# Patient Record
Sex: MALE | Race: WHITE | NOT HISPANIC OR LATINO | Employment: OTHER | URBAN - METROPOLITAN AREA
[De-identification: names, ages, dates, MRNs, and addresses within clinical notes are randomized per-mention and may not be internally consistent; named-entity substitution may affect disease eponyms.]

---

## 2020-01-01 DIAGNOSIS — R06.02 SHORTNESS OF BREATH: ICD-10-CM

## 2020-01-01 LAB — SARS-COV-2 RNA SPEC QL NAA+PROBE: NOT DETECTED

## 2020-01-01 PROCEDURE — U0003 INFECTIOUS AGENT DETECTION BY NUCLEIC ACID (DNA OR RNA); SEVERE ACUTE RESPIRATORY SYNDROME CORONAVIRUS 2 (SARS-COV-2) (CORONAVIRUS DISEASE [COVID-19]), AMPLIFIED PROBE TECHNIQUE, MAKING USE OF HIGH THROUGHPUT TECHNOLOGIES AS DESCRIBED BY CMS-2020-01-R: HCPCS | Performed by: INTERNAL MEDICINE

## 2021-01-01 ENCOUNTER — APPOINTMENT (INPATIENT)
Dept: NON INVASIVE DIAGNOSTICS | Facility: HOSPITAL | Age: 71
DRG: 871 | End: 2021-01-01
Payer: MEDICARE

## 2021-01-01 ENCOUNTER — HOSPITAL ENCOUNTER (INPATIENT)
Facility: HOSPITAL | Age: 71
LOS: 2 days | DRG: 871 | End: 2021-02-10
Attending: EMERGENCY MEDICINE | Admitting: ANESTHESIOLOGY
Payer: MEDICARE

## 2021-01-01 ENCOUNTER — APPOINTMENT (EMERGENCY)
Dept: RADIOLOGY | Facility: HOSPITAL | Age: 71
DRG: 871 | End: 2021-01-01
Payer: MEDICARE

## 2021-01-01 ENCOUNTER — APPOINTMENT (INPATIENT)
Dept: RADIOLOGY | Facility: HOSPITAL | Age: 71
DRG: 871 | End: 2021-01-01
Payer: MEDICARE

## 2021-01-01 VITALS
BODY MASS INDEX: 43.18 KG/M2 | WEIGHT: 275.13 LBS | SYSTOLIC BLOOD PRESSURE: 128 MMHG | OXYGEN SATURATION: 81 % | TEMPERATURE: 100 F | DIASTOLIC BLOOD PRESSURE: 97 MMHG | HEIGHT: 67 IN | RESPIRATION RATE: 150 BRPM

## 2021-01-01 DIAGNOSIS — B95.8 BACTEREMIA DUE TO STAPHYLOCOCCUS: ICD-10-CM

## 2021-01-01 DIAGNOSIS — N17.9 AKI (ACUTE KIDNEY INJURY) (HCC): ICD-10-CM

## 2021-01-01 DIAGNOSIS — E11.9 DIABETES (HCC): ICD-10-CM

## 2021-01-01 DIAGNOSIS — D17.71 ANGIOLIPOMA OF LEFT KIDNEY: ICD-10-CM

## 2021-01-01 DIAGNOSIS — J96.92 RESPIRATORY FAILURE WITH HYPERCAPNIA (HCC): ICD-10-CM

## 2021-01-01 DIAGNOSIS — J18.9 PNEUMONIA: ICD-10-CM

## 2021-01-01 DIAGNOSIS — R65.20 SEVERE SEPSIS (HCC): ICD-10-CM

## 2021-01-01 DIAGNOSIS — A41.9 SEVERE SEPSIS (HCC): ICD-10-CM

## 2021-01-01 DIAGNOSIS — R78.81 BACTEREMIA DUE TO STAPHYLOCOCCUS: ICD-10-CM

## 2021-01-01 DIAGNOSIS — I21.4 NSTEMI (NON-ST ELEVATED MYOCARDIAL INFARCTION) (HCC): ICD-10-CM

## 2021-01-01 DIAGNOSIS — I50.9 CHF (CONGESTIVE HEART FAILURE) (HCC): Primary | ICD-10-CM

## 2021-01-01 DIAGNOSIS — E63.8 IMBALANCED NUTRITION: ICD-10-CM

## 2021-01-01 LAB
ALBUMIN SERPL BCP-MCNC: 2.9 G/DL (ref 3.5–5)
ALP SERPL-CCNC: 86 U/L (ref 46–116)
ALT SERPL W P-5'-P-CCNC: 28 U/L (ref 12–78)
AMMONIA PLAS-SCNC: <10 UMOL/L (ref 11–35)
ANION GAP SERPL CALCULATED.3IONS-SCNC: 4 MMOL/L (ref 4–13)
ANION GAP SERPL CALCULATED.3IONS-SCNC: 5 MMOL/L (ref 4–13)
ANION GAP SERPL CALCULATED.3IONS-SCNC: 8 MMOL/L (ref 4–13)
APTT PPP: 33 SECONDS (ref 23–37)
ARTERIAL PATENCY WRIST A: YES
AST SERPL W P-5'-P-CCNC: 41 U/L (ref 5–45)
ATRIAL RATE: 121 BPM
BACTERIA UR QL AUTO: ABNORMAL /HPF
BASE EXCESS BLDA CALC-SCNC: 1.8 MMOL/L
BASOPHILS # BLD AUTO: 0.02 THOUSANDS/ΜL (ref 0–0.1)
BASOPHILS # BLD AUTO: 0.03 THOUSANDS/ΜL (ref 0–0.1)
BASOPHILS NFR BLD AUTO: 0 % (ref 0–1)
BASOPHILS NFR BLD AUTO: 0 % (ref 0–1)
BILIRUB SERPL-MCNC: 0.8 MG/DL (ref 0.2–1)
BILIRUB UR QL STRIP: NEGATIVE
BODY TEMPERATURE: 98 DEGREES FEHRENHEIT
BUN SERPL-MCNC: 40 MG/DL (ref 5–25)
BUN SERPL-MCNC: 55 MG/DL (ref 5–25)
BUN SERPL-MCNC: 71 MG/DL (ref 5–25)
CALCIUM ALBUM COR SERPL-MCNC: 9.6 MG/DL (ref 8.3–10.1)
CALCIUM SERPL-MCNC: 8.5 MG/DL (ref 8.3–10.1)
CALCIUM SERPL-MCNC: 8.6 MG/DL (ref 8.3–10.1)
CALCIUM SERPL-MCNC: 8.7 MG/DL (ref 8.3–10.1)
CHLORIDE SERPL-SCNC: 100 MMOL/L (ref 100–108)
CHLORIDE SERPL-SCNC: 100 MMOL/L (ref 100–108)
CHLORIDE SERPL-SCNC: 98 MMOL/L (ref 100–108)
CLARITY UR: CLEAR
CO2 SERPL-SCNC: 31 MMOL/L (ref 21–32)
CO2 SERPL-SCNC: 33 MMOL/L (ref 21–32)
CO2 SERPL-SCNC: 33 MMOL/L (ref 21–32)
COLOR UR: YELLOW
CREAT SERPL-MCNC: 1.88 MG/DL (ref 0.6–1.3)
CREAT SERPL-MCNC: 2.45 MG/DL (ref 0.6–1.3)
CREAT SERPL-MCNC: 3.42 MG/DL (ref 0.6–1.3)
CRP SERPL QL: 169 MG/L
EOSINOPHIL # BLD AUTO: 0 THOUSAND/ΜL (ref 0–0.61)
EOSINOPHIL # BLD AUTO: 0 THOUSAND/ΜL (ref 0–0.61)
EOSINOPHIL NFR BLD AUTO: 0 % (ref 0–6)
EOSINOPHIL NFR BLD AUTO: 0 % (ref 0–6)
ERYTHROCYTE [DISTWIDTH] IN BLOOD BY AUTOMATED COUNT: 19.3 % (ref 11.6–15.1)
ERYTHROCYTE [DISTWIDTH] IN BLOOD BY AUTOMATED COUNT: 19.8 % (ref 11.6–15.1)
ERYTHROCYTE [SEDIMENTATION RATE] IN BLOOD: 80 MM/HOUR (ref 0–19)
EST. AVERAGE GLUCOSE BLD GHB EST-MCNC: 197 MG/DL
FLUAV RNA RESP QL NAA+PROBE: NEGATIVE
FLUBV RNA RESP QL NAA+PROBE: NEGATIVE
GFR SERPL CREATININE-BSD FRML MDRD: 17 ML/MIN/1.73SQ M
GFR SERPL CREATININE-BSD FRML MDRD: 26 ML/MIN/1.73SQ M
GFR SERPL CREATININE-BSD FRML MDRD: 35 ML/MIN/1.73SQ M
GLUCOSE SERPL-MCNC: 172 MG/DL (ref 65–140)
GLUCOSE SERPL-MCNC: 179 MG/DL (ref 65–140)
GLUCOSE SERPL-MCNC: 190 MG/DL (ref 65–140)
GLUCOSE SERPL-MCNC: 192 MG/DL (ref 65–140)
GLUCOSE SERPL-MCNC: 196 MG/DL (ref 65–140)
GLUCOSE SERPL-MCNC: 217 MG/DL (ref 65–140)
GLUCOSE UR STRIP-MCNC: NEGATIVE MG/DL
HBA1C MFR BLD: 8.5 %
HCO3 BLDA-SCNC: 30.5 MMOL/L (ref 22–28)
HCT VFR BLD AUTO: 51.6 % (ref 36.5–49.3)
HCT VFR BLD AUTO: 54.5 % (ref 36.5–49.3)
HGB BLD-MCNC: 14.2 G/DL (ref 12–17)
HGB BLD-MCNC: 15.1 G/DL (ref 12–17)
HGB UR QL STRIP.AUTO: ABNORMAL
HYALINE CASTS #/AREA URNS LPF: ABNORMAL /LPF
IMM GRANULOCYTES # BLD AUTO: 0.03 THOUSAND/UL (ref 0–0.2)
IMM GRANULOCYTES # BLD AUTO: 0.06 THOUSAND/UL (ref 0–0.2)
IMM GRANULOCYTES NFR BLD AUTO: 0 % (ref 0–2)
IMM GRANULOCYTES NFR BLD AUTO: 0 % (ref 0–2)
INR PPP: 1.15 (ref 0.84–1.19)
IPAP: 16
KETONES UR STRIP-MCNC: NEGATIVE MG/DL
L PNEUMO1 AG UR QL IA.RAPID: NEGATIVE
LACTATE SERPL-SCNC: 1.4 MMOL/L (ref 0.5–2)
LACTATE SERPL-SCNC: 2.2 MMOL/L (ref 0.5–2)
LEUKOCYTE ESTERASE UR QL STRIP: NEGATIVE
LYMPHOCYTES # BLD AUTO: 0.27 THOUSANDS/ΜL (ref 0.6–4.47)
LYMPHOCYTES # BLD AUTO: 0.28 THOUSANDS/ΜL (ref 0.6–4.47)
LYMPHOCYTES NFR BLD AUTO: 2 % (ref 14–44)
LYMPHOCYTES NFR BLD AUTO: 3 % (ref 14–44)
MAGNESIUM SERPL-MCNC: 1.9 MG/DL (ref 1.6–2.6)
MAGNESIUM SERPL-MCNC: 2.9 MG/DL (ref 1.6–2.6)
MCH RBC QN AUTO: 19 PG (ref 26.8–34.3)
MCH RBC QN AUTO: 19.3 PG (ref 26.8–34.3)
MCHC RBC AUTO-ENTMCNC: 27.5 G/DL (ref 31.4–37.4)
MCHC RBC AUTO-ENTMCNC: 27.7 G/DL (ref 31.4–37.4)
MCV RBC AUTO: 69 FL (ref 82–98)
MCV RBC AUTO: 70 FL (ref 82–98)
MONOCYTES # BLD AUTO: 0.99 THOUSAND/ΜL (ref 0.17–1.22)
MONOCYTES # BLD AUTO: 1.05 THOUSAND/ΜL (ref 0.17–1.22)
MONOCYTES NFR BLD AUTO: 8 % (ref 4–12)
MONOCYTES NFR BLD AUTO: 9 % (ref 4–12)
NEUTROPHILS # BLD AUTO: 12.05 THOUSANDS/ΜL (ref 1.85–7.62)
NEUTROPHILS # BLD AUTO: 9.51 THOUSANDS/ΜL (ref 1.85–7.62)
NEUTS SEG NFR BLD AUTO: 88 % (ref 43–75)
NEUTS SEG NFR BLD AUTO: 90 % (ref 43–75)
NITRITE UR QL STRIP: NEGATIVE
NON VENT- BIPAP: ABNORMAL
NON-SQ EPI CELLS URNS QL MICRO: ABNORMAL /HPF
NRBC BLD AUTO-RTO: 0 /100 WBCS
NRBC BLD AUTO-RTO: 0 /100 WBCS
NT-PROBNP SERPL-MCNC: 1624 PG/ML
O2 CT BLDA-SCNC: 96 ML/DL (ref 95–100)
OTHER STN SPEC: ABNORMAL
OXYHGB MFR BLDA: 94.3 % (ref 94–97)
P AXIS: -29 DEGREES
PCO2 BLDA: 65.7 MM HG (ref 36–44)
PCO2 TEMP ADJ BLDA: 64.8 MM HG (ref 36–44)
PEEP MAX SETTING VENT: 5 CM[H2O]
PH BLD: 7.29 [PH] (ref 7.35–7.45)
PH BLDA: 7.28 [PH] (ref 7.35–7.45)
PH UR STRIP.AUTO: 5 [PH]
PHOSPHATE SERPL-MCNC: 6.9 MG/DL (ref 2.3–4.1)
PLATELET # BLD AUTO: 256 THOUSANDS/UL (ref 149–390)
PLATELET # BLD AUTO: 304 THOUSANDS/UL (ref 149–390)
PMV BLD AUTO: 8.7 FL (ref 8.9–12.7)
PMV BLD AUTO: 9 FL (ref 8.9–12.7)
PO2 BLD: 92.8 MM HG (ref 75–129)
PO2 BLDA: 94.5 MM HG (ref 75–129)
POTASSIUM SERPL-SCNC: 4.6 MMOL/L (ref 3.5–5.3)
POTASSIUM SERPL-SCNC: 5.1 MMOL/L (ref 3.5–5.3)
POTASSIUM SERPL-SCNC: 5.2 MMOL/L (ref 3.5–5.3)
PR INTERVAL: 174 MS
PROCALCITONIN SERPL-MCNC: 1.17 NG/ML
PROT SERPL-MCNC: 7.6 G/DL (ref 6.4–8.2)
PROT UR STRIP-MCNC: ABNORMAL MG/DL
PROTHROMBIN TIME: 14.6 SECONDS (ref 11.6–14.5)
QRS AXIS: 56 DEGREES
QRSD INTERVAL: 92 MS
QT INTERVAL: 326 MS
QTC INTERVAL: 462 MS
RBC # BLD AUTO: 7.34 MILLION/UL (ref 3.88–5.62)
RBC # BLD AUTO: 7.96 MILLION/UL (ref 3.88–5.62)
RBC #/AREA URNS AUTO: ABNORMAL /HPF
RSV RNA RESP QL NAA+PROBE: NEGATIVE
S PNEUM AG UR QL: NEGATIVE
SARS-COV-2 RNA RESP QL NAA+PROBE: NEGATIVE
SODIUM SERPL-SCNC: 136 MMOL/L (ref 136–145)
SODIUM SERPL-SCNC: 137 MMOL/L (ref 136–145)
SODIUM SERPL-SCNC: 139 MMOL/L (ref 136–145)
SP GR UR STRIP.AUTO: 1.02 (ref 1–1.03)
SPECIMEN SOURCE: ABNORMAL
T WAVE AXIS: 12 DEGREES
TROPONIN I SERPL-MCNC: 0.1 NG/ML
TROPONIN I SERPL-MCNC: 0.11 NG/ML
TROPONIN I SERPL-MCNC: 0.12 NG/ML
TSH SERPL DL<=0.05 MIU/L-ACNC: 0.83 UIU/ML (ref 0.36–3.74)
UROBILINOGEN UR QL STRIP.AUTO: 0.2 E.U./DL
VENT BIPAP FIO2: 60 %
VENTRICULAR RATE: 121 BPM
WBC # BLD AUTO: 10.82 THOUSAND/UL (ref 4.31–10.16)
WBC # BLD AUTO: 13.47 THOUSAND/UL (ref 4.31–10.16)
WBC #/AREA URNS AUTO: ABNORMAL /HPF

## 2021-01-01 PROCEDURE — 87081 CULTURE SCREEN ONLY: CPT | Performed by: ANESTHESIOLOGY

## 2021-01-01 PROCEDURE — G1004 CDSM NDSC: HCPCS

## 2021-01-01 PROCEDURE — 99291 CRITICAL CARE FIRST HOUR: CPT | Performed by: ANESTHESIOLOGY

## 2021-01-01 PROCEDURE — 84484 ASSAY OF TROPONIN QUANT: CPT | Performed by: EMERGENCY MEDICINE

## 2021-01-01 PROCEDURE — 94760 N-INVAS EAR/PLS OXIMETRY 1: CPT

## 2021-01-01 PROCEDURE — 83880 ASSAY OF NATRIURETIC PEPTIDE: CPT | Performed by: EMERGENCY MEDICINE

## 2021-01-01 PROCEDURE — 74177 CT ABD & PELVIS W/CONTRAST: CPT

## 2021-01-01 PROCEDURE — 85025 COMPLETE CBC W/AUTO DIFF WBC: CPT | Performed by: PHYSICIAN ASSISTANT

## 2021-01-01 PROCEDURE — 93970 EXTREMITY STUDY: CPT | Performed by: SURGERY

## 2021-01-01 PROCEDURE — 80048 BASIC METABOLIC PNL TOTAL CA: CPT | Performed by: PHYSICIAN ASSISTANT

## 2021-01-01 PROCEDURE — 70487 CT MAXILLOFACIAL W/DYE: CPT

## 2021-01-01 PROCEDURE — 93970 EXTREMITY STUDY: CPT

## 2021-01-01 PROCEDURE — 36600 WITHDRAWAL OF ARTERIAL BLOOD: CPT

## 2021-01-01 PROCEDURE — C8929 TTE W OR WO FOL WCON,DOPPLER: HCPCS

## 2021-01-01 PROCEDURE — 71260 CT THORAX DX C+: CPT

## 2021-01-01 PROCEDURE — 71045 X-RAY EXAM CHEST 1 VIEW: CPT

## 2021-01-01 PROCEDURE — 31500 INSERT EMERGENCY AIRWAY: CPT | Performed by: PHYSICIAN ASSISTANT

## 2021-01-01 PROCEDURE — 84484 ASSAY OF TROPONIN QUANT: CPT

## 2021-01-01 PROCEDURE — 84100 ASSAY OF PHOSPHORUS: CPT | Performed by: PHYSICIAN ASSISTANT

## 2021-01-01 PROCEDURE — 99223 1ST HOSP IP/OBS HIGH 75: CPT | Performed by: INTERNAL MEDICINE

## 2021-01-01 PROCEDURE — 99291 CRITICAL CARE FIRST HOUR: CPT | Performed by: EMERGENCY MEDICINE

## 2021-01-01 PROCEDURE — 94640 AIRWAY INHALATION TREATMENT: CPT

## 2021-01-01 PROCEDURE — 96365 THER/PROPH/DIAG IV INF INIT: CPT

## 2021-01-01 PROCEDURE — 93005 ELECTROCARDIOGRAM TRACING: CPT

## 2021-01-01 PROCEDURE — 99238 HOSP IP/OBS DSCHRG MGMT 30/<: CPT | Performed by: PHYSICIAN ASSISTANT

## 2021-01-01 PROCEDURE — 85610 PROTHROMBIN TIME: CPT | Performed by: EMERGENCY MEDICINE

## 2021-01-01 PROCEDURE — 0241U HB NFCT DS VIR RESP RNA 4 TRGT: CPT | Performed by: EMERGENCY MEDICINE

## 2021-01-01 PROCEDURE — 92950 HEART/LUNG RESUSCITATION CPR: CPT

## 2021-01-01 PROCEDURE — 87449 NOS EACH ORGANISM AG IA: CPT | Performed by: ANESTHESIOLOGY

## 2021-01-01 PROCEDURE — 94668 MNPJ CHEST WALL SBSQ: CPT

## 2021-01-01 PROCEDURE — 96375 TX/PRO/DX INJ NEW DRUG ADDON: CPT

## 2021-01-01 PROCEDURE — 83605 ASSAY OF LACTIC ACID: CPT | Performed by: EMERGENCY MEDICINE

## 2021-01-01 PROCEDURE — 93306 TTE W/DOPPLER COMPLETE: CPT | Performed by: INTERNAL MEDICINE

## 2021-01-01 PROCEDURE — 85652 RBC SED RATE AUTOMATED: CPT | Performed by: EMERGENCY MEDICINE

## 2021-01-01 PROCEDURE — 84484 ASSAY OF TROPONIN QUANT: CPT | Performed by: STUDENT IN AN ORGANIZED HEALTH CARE EDUCATION/TRAINING PROGRAM

## 2021-01-01 PROCEDURE — 82948 REAGENT STRIP/BLOOD GLUCOSE: CPT

## 2021-01-01 PROCEDURE — 94660 CPAP INITIATION&MGMT: CPT

## 2021-01-01 PROCEDURE — 96374 THER/PROPH/DIAG INJ IV PUSH: CPT

## 2021-01-01 PROCEDURE — 83735 ASSAY OF MAGNESIUM: CPT | Performed by: EMERGENCY MEDICINE

## 2021-01-01 PROCEDURE — 86140 C-REACTIVE PROTEIN: CPT | Performed by: EMERGENCY MEDICINE

## 2021-01-01 PROCEDURE — 0BH17EZ INSERTION OF ENDOTRACHEAL AIRWAY INTO TRACHEA, VIA NATURAL OR ARTIFICIAL OPENING: ICD-10-PCS | Performed by: ANESTHESIOLOGY

## 2021-01-01 PROCEDURE — 82805 BLOOD GASES W/O2 SATURATION: CPT | Performed by: EMERGENCY MEDICINE

## 2021-01-01 PROCEDURE — 5A1935Z RESPIRATORY VENTILATION, LESS THAN 24 CONSECUTIVE HOURS: ICD-10-PCS | Performed by: ANESTHESIOLOGY

## 2021-01-01 PROCEDURE — 87186 SC STD MICRODIL/AGAR DIL: CPT | Performed by: EMERGENCY MEDICINE

## 2021-01-01 PROCEDURE — 70450 CT HEAD/BRAIN W/O DYE: CPT

## 2021-01-01 PROCEDURE — 99233 SBSQ HOSP IP/OBS HIGH 50: CPT | Performed by: ANESTHESIOLOGY

## 2021-01-01 PROCEDURE — 80053 COMPREHEN METABOLIC PANEL: CPT | Performed by: EMERGENCY MEDICINE

## 2021-01-01 PROCEDURE — 85730 THROMBOPLASTIN TIME PARTIAL: CPT | Performed by: EMERGENCY MEDICINE

## 2021-01-01 PROCEDURE — 84145 PROCALCITONIN (PCT): CPT | Performed by: STUDENT IN AN ORGANIZED HEALTH CARE EDUCATION/TRAINING PROGRAM

## 2021-01-01 PROCEDURE — 87040 BLOOD CULTURE FOR BACTERIA: CPT | Performed by: EMERGENCY MEDICINE

## 2021-01-01 PROCEDURE — 83036 HEMOGLOBIN GLYCOSYLATED A1C: CPT | Performed by: STUDENT IN AN ORGANIZED HEALTH CARE EDUCATION/TRAINING PROGRAM

## 2021-01-01 PROCEDURE — 93010 ELECTROCARDIOGRAM REPORT: CPT | Performed by: INTERNAL MEDICINE

## 2021-01-01 PROCEDURE — 94002 VENT MGMT INPAT INIT DAY: CPT

## 2021-01-01 PROCEDURE — 82140 ASSAY OF AMMONIA: CPT | Performed by: EMERGENCY MEDICINE

## 2021-01-01 PROCEDURE — 83735 ASSAY OF MAGNESIUM: CPT | Performed by: PHYSICIAN ASSISTANT

## 2021-01-01 PROCEDURE — 80048 BASIC METABOLIC PNL TOTAL CA: CPT | Performed by: STUDENT IN AN ORGANIZED HEALTH CARE EDUCATION/TRAINING PROGRAM

## 2021-01-01 PROCEDURE — 85025 COMPLETE CBC W/AUTO DIFF WBC: CPT | Performed by: EMERGENCY MEDICINE

## 2021-01-01 PROCEDURE — 36415 COLL VENOUS BLD VENIPUNCTURE: CPT | Performed by: EMERGENCY MEDICINE

## 2021-01-01 PROCEDURE — 84443 ASSAY THYROID STIM HORMONE: CPT | Performed by: ANESTHESIOLOGY

## 2021-01-01 PROCEDURE — 81001 URINALYSIS AUTO W/SCOPE: CPT | Performed by: EMERGENCY MEDICINE

## 2021-01-01 PROCEDURE — 99291 CRITICAL CARE FIRST HOUR: CPT

## 2021-01-01 RX ORDER — FUROSEMIDE 10 MG/ML
40 INJECTION INTRAMUSCULAR; INTRAVENOUS ONCE
Status: COMPLETED | OUTPATIENT
Start: 2021-01-01 | End: 2021-01-01

## 2021-01-01 RX ORDER — CEFTRIAXONE 1 G/50ML
1000 INJECTION, SOLUTION INTRAVENOUS EVERY 24 HOURS
Status: DISCONTINUED | OUTPATIENT
Start: 2021-01-01 | End: 2021-01-01

## 2021-01-01 RX ORDER — ACETAMINOPHEN 325 MG/1
650 TABLET ORAL EVERY 6 HOURS PRN
Status: DISCONTINUED | OUTPATIENT
Start: 2021-01-01 | End: 2021-02-10 | Stop reason: HOSPADM

## 2021-01-01 RX ORDER — HALOPERIDOL 5 MG/ML
5 INJECTION INTRAMUSCULAR ONCE
Status: COMPLETED | OUTPATIENT
Start: 2021-01-01 | End: 2021-01-01

## 2021-01-01 RX ORDER — NYSTATIN 100000 [USP'U]/G
POWDER TOPICAL 2 TIMES DAILY
Status: DISCONTINUED | OUTPATIENT
Start: 2021-01-01 | End: 2021-02-10 | Stop reason: HOSPADM

## 2021-01-01 RX ORDER — EPINEPHRINE 0.1 MG/ML
SYRINGE (ML) INJECTION CODE/TRAUMA/SEDATION MEDICATION
Status: COMPLETED | OUTPATIENT
Start: 2021-01-01 | End: 2021-01-01

## 2021-01-01 RX ORDER — CALCIUM CHLORIDE 100 MG/ML
SYRINGE (ML) INTRAVENOUS CODE/TRAUMA/SEDATION MEDICATION
Status: COMPLETED | OUTPATIENT
Start: 2021-01-01 | End: 2021-01-01

## 2021-01-01 RX ORDER — SODIUM BICARBONATE 84 MG/ML
INJECTION, SOLUTION INTRAVENOUS CODE/TRAUMA/SEDATION MEDICATION
Status: COMPLETED | OUTPATIENT
Start: 2021-01-01 | End: 2021-01-01

## 2021-01-01 RX ORDER — HEPARIN SODIUM 5000 [USP'U]/ML
5000 INJECTION, SOLUTION INTRAVENOUS; SUBCUTANEOUS EVERY 8 HOURS SCHEDULED
Status: DISCONTINUED | OUTPATIENT
Start: 2021-01-01 | End: 2021-02-10 | Stop reason: HOSPADM

## 2021-01-01 RX ORDER — VANCOMYCIN HYDROCHLORIDE 1 G/200ML
10 INJECTION, SOLUTION INTRAVENOUS EVERY 12 HOURS
Status: DISCONTINUED | OUTPATIENT
Start: 2021-01-01 | End: 2021-02-10 | Stop reason: HOSPADM

## 2021-01-01 RX ORDER — HALOPERIDOL 5 MG/ML
5 INJECTION INTRAMUSCULAR EVERY 6 HOURS PRN
Status: DISCONTINUED | OUTPATIENT
Start: 2021-01-01 | End: 2021-02-10 | Stop reason: HOSPADM

## 2021-01-01 RX ORDER — ALBUTEROL SULFATE 2.5 MG/3ML
2.5 SOLUTION RESPIRATORY (INHALATION)
Status: DISCONTINUED | OUTPATIENT
Start: 2021-01-01 | End: 2021-02-10 | Stop reason: HOSPADM

## 2021-01-01 RX ORDER — SODIUM CHLORIDE, SODIUM LACTATE, POTASSIUM CHLORIDE, CALCIUM CHLORIDE 600; 310; 30; 20 MG/100ML; MG/100ML; MG/100ML; MG/100ML
100 INJECTION, SOLUTION INTRAVENOUS CONTINUOUS
Status: DISCONTINUED | OUTPATIENT
Start: 2021-01-01 | End: 2021-02-10 | Stop reason: HOSPADM

## 2021-01-01 RX ORDER — ACETAMINOPHEN 325 MG/1
650 TABLET ORAL ONCE
Status: COMPLETED | OUTPATIENT
Start: 2021-01-01 | End: 2021-01-01

## 2021-01-01 RX ORDER — ALBUTEROL SULFATE 2.5 MG/3ML
2.5 SOLUTION RESPIRATORY (INHALATION) EVERY 4 HOURS PRN
Status: DISCONTINUED | OUTPATIENT
Start: 2021-01-01 | End: 2021-02-10 | Stop reason: HOSPADM

## 2021-01-01 RX ORDER — CEFTRIAXONE 1 G/50ML
1000 INJECTION, SOLUTION INTRAVENOUS ONCE
Status: COMPLETED | OUTPATIENT
Start: 2021-01-01 | End: 2021-01-01

## 2021-01-01 RX ORDER — MAGNESIUM SULFATE HEPTAHYDRATE 40 MG/ML
2 INJECTION, SOLUTION INTRAVENOUS ONCE
Status: COMPLETED | OUTPATIENT
Start: 2021-01-01 | End: 2021-01-01

## 2021-01-01 RX ADMIN — EPINEPHRINE 1 MG: 0.1 INJECTION, SOLUTION ENDOTRACHEAL; INTRACARDIAC; INTRAVENOUS at 22:28

## 2021-01-01 RX ADMIN — AZITHROMYCIN MONOHYDRATE 500 MG: 500 INJECTION, POWDER, LYOPHILIZED, FOR SOLUTION INTRAVENOUS at 12:25

## 2021-01-01 RX ADMIN — VANCOMYCIN HYDROCHLORIDE 1000 MG: 1 INJECTION, SOLUTION INTRAVENOUS at 06:47

## 2021-01-01 RX ADMIN — HALOPERIDOL LACTATE 5 MG: 5 INJECTION, SOLUTION INTRAMUSCULAR at 20:45

## 2021-01-01 RX ADMIN — AMPICILLIN SODIUM AND SULBACTAM SODIUM 3 G: 2; 1 INJECTION, POWDER, FOR SOLUTION INTRAMUSCULAR; INTRAVENOUS at 07:55

## 2021-01-01 RX ADMIN — SODIUM BICARBONATE 50 MEQ: 84 INJECTION, SOLUTION INTRAVENOUS at 22:38

## 2021-01-01 RX ADMIN — NYSTATIN: 100000 POWDER TOPICAL at 18:35

## 2021-01-01 RX ADMIN — EPINEPHRINE 1 MG: 0.1 INJECTION, SOLUTION ENDOTRACHEAL; INTRACARDIAC; INTRAVENOUS at 22:37

## 2021-01-01 RX ADMIN — AMPICILLIN SODIUM AND SULBACTAM SODIUM 3 G: 2; 1 INJECTION, POWDER, FOR SOLUTION INTRAMUSCULAR; INTRAVENOUS at 20:40

## 2021-01-01 RX ADMIN — INSULIN LISPRO 2 UNITS: 100 INJECTION, SOLUTION INTRAVENOUS; SUBCUTANEOUS at 18:33

## 2021-01-01 RX ADMIN — HALOPERIDOL LACTATE 5 MG: 5 INJECTION, SOLUTION INTRAMUSCULAR at 04:49

## 2021-01-01 RX ADMIN — VANCOMYCIN HYDROCHLORIDE 1000 MG: 1 INJECTION, SOLUTION INTRAVENOUS at 19:24

## 2021-01-01 RX ADMIN — PERFLUTREN 2 ML/MIN: 6.52 INJECTION, SUSPENSION INTRAVENOUS at 14:20

## 2021-01-01 RX ADMIN — IOHEXOL 100 ML: 350 INJECTION, SOLUTION INTRAVENOUS at 14:22

## 2021-01-01 RX ADMIN — SODIUM BICARBONATE 50 MEQ: 84 INJECTION, SOLUTION INTRAVENOUS at 22:35

## 2021-01-01 RX ADMIN — EPINEPHRINE 1 MG: 0.1 INJECTION, SOLUTION ENDOTRACHEAL; INTRACARDIAC; INTRAVENOUS at 22:43

## 2021-01-01 RX ADMIN — EPINEPHRINE 1 MG: 0.1 INJECTION, SOLUTION ENDOTRACHEAL; INTRACARDIAC; INTRAVENOUS at 22:34

## 2021-01-01 RX ADMIN — NYSTATIN: 100000 POWDER TOPICAL at 09:00

## 2021-01-01 RX ADMIN — AZITHROMYCIN MONOHYDRATE 500 MG: 500 INJECTION, POWDER, LYOPHILIZED, FOR SOLUTION INTRAVENOUS at 11:58

## 2021-01-01 RX ADMIN — SODIUM CHLORIDE, SODIUM LACTATE, POTASSIUM CHLORIDE, AND CALCIUM CHLORIDE 100 ML/HR: .6; .31; .03; .02 INJECTION, SOLUTION INTRAVENOUS at 21:47

## 2021-01-01 RX ADMIN — AMPICILLIN SODIUM AND SULBACTAM SODIUM 3 G: 2; 1 INJECTION, POWDER, FOR SOLUTION INTRAMUSCULAR; INTRAVENOUS at 02:30

## 2021-01-01 RX ADMIN — INSULIN LISPRO 1 UNITS: 100 INJECTION, SOLUTION INTRAVENOUS; SUBCUTANEOUS at 12:21

## 2021-01-01 RX ADMIN — SODIUM CHLORIDE, SODIUM LACTATE, POTASSIUM CHLORIDE, AND CALCIUM CHLORIDE 50 ML/HR: .6; .31; .03; .02 INJECTION, SOLUTION INTRAVENOUS at 06:47

## 2021-01-01 RX ADMIN — EPINEPHRINE 1 MG: 0.1 INJECTION, SOLUTION ENDOTRACHEAL; INTRACARDIAC; INTRAVENOUS at 22:40

## 2021-01-01 RX ADMIN — FUROSEMIDE 40 MG: 10 INJECTION, SOLUTION INTRAMUSCULAR; INTRAVENOUS at 13:14

## 2021-01-01 RX ADMIN — CALCIUM CHLORIDE 1 G: 100 INJECTION PARENTERAL at 22:45

## 2021-01-01 RX ADMIN — HEPARIN SODIUM 5000 UNITS: 5000 INJECTION INTRAVENOUS; SUBCUTANEOUS at 14:44

## 2021-01-01 RX ADMIN — ALBUTEROL SULFATE 2.5 MG: 2.5 SOLUTION RESPIRATORY (INHALATION) at 21:25

## 2021-01-01 RX ADMIN — VANCOMYCIN HYDROCHLORIDE 1000 MG: 1 INJECTION, SOLUTION INTRAVENOUS at 18:40

## 2021-01-01 RX ADMIN — CALCIUM CHLORIDE 1 G: 100 INJECTION PARENTERAL at 22:26

## 2021-01-01 RX ADMIN — EPINEPHRINE 1 MG: 0.1 INJECTION, SOLUTION ENDOTRACHEAL; INTRACARDIAC; INTRAVENOUS at 22:25

## 2021-01-01 RX ADMIN — HEPARIN SODIUM 5000 UNITS: 5000 INJECTION INTRAVENOUS; SUBCUTANEOUS at 21:46

## 2021-01-01 RX ADMIN — NYSTATIN 1 APPLICATION: 100000 POWDER TOPICAL at 20:47

## 2021-01-01 RX ADMIN — SODIUM BICARBONATE 50 MEQ: 84 INJECTION, SOLUTION INTRAVENOUS at 22:32

## 2021-01-01 RX ADMIN — EPINEPHRINE 1 MG: 0.1 INJECTION, SOLUTION ENDOTRACHEAL; INTRACARDIAC; INTRAVENOUS at 22:46

## 2021-01-01 RX ADMIN — ACETAMINOPHEN 650 MG: 325 TABLET, FILM COATED ORAL at 13:53

## 2021-01-01 RX ADMIN — EPINEPHRINE 1 MG: 0.1 INJECTION, SOLUTION ENDOTRACHEAL; INTRACARDIAC; INTRAVENOUS at 22:49

## 2021-01-01 RX ADMIN — EPINEPHRINE 1 MG: 0.1 INJECTION, SOLUTION ENDOTRACHEAL; INTRACARDIAC; INTRAVENOUS at 22:31

## 2021-01-01 RX ADMIN — MAGNESIUM SULFATE HEPTAHYDRATE 2 G: 40 INJECTION, SOLUTION INTRAVENOUS at 21:46

## 2021-01-01 RX ADMIN — CEFTRIAXONE 1000 MG: 1 INJECTION, SOLUTION INTRAVENOUS at 12:13

## 2021-02-08 PROBLEM — S09.90XA HEAD INJURY: Status: ACTIVE | Noted: 2021-01-01

## 2021-02-08 PROBLEM — R65.20 SEVERE SEPSIS (HCC): Status: ACTIVE | Noted: 2021-01-01

## 2021-02-08 PROBLEM — E11.9 TYPE 2 DIABETES MELLITUS (HCC): Status: ACTIVE | Noted: 2021-01-01

## 2021-02-08 PROBLEM — A41.9 SEVERE SEPSIS (HCC): Status: ACTIVE | Noted: 2021-01-01

## 2021-02-08 PROBLEM — J96.01 ACUTE RESPIRATORY FAILURE WITH HYPOXIA (HCC): Status: ACTIVE | Noted: 2021-01-01

## 2021-02-08 PROBLEM — D17.71 ANGIOLIPOMA OF LEFT KIDNEY: Status: ACTIVE | Noted: 2021-01-01

## 2021-02-08 NOTE — ASSESSMENT & PLAN NOTE
Patient's Cr on admission 1 88  Baseline Cr not well known given last Cr in 2016 possible baseline about 1 0-1 1  · Avoid nephrotoxic medications  · Currently not on fluids given possible CHF exacerbation given BNP 1624 with no previous lab work per chart, Diuresed 40mg IV lasix in ED  Tolerating Diet  · Daily BMP and monitor Cr  Will await Echocardiogram results

## 2021-02-08 NOTE — ASSESSMENT & PLAN NOTE
Patient with no reported past medical hx of COPD, CHF, SUDHAKAR, presents with 5 day hx of shortness of breath which he reports occurred after he tripped and fell in home and hit his head  CT head no acute intracranial abnormality  BNP elevated at 1624, no previous baseline per chart review  COVID 19, Influenza A and B, and RSV PCR Negative  Blood gas in ED shows pH 7 28, pCO2, pO2 92 8, Bicarb 30 5  He was diuresed with 40mg Lasix IV IN ER  CXR shows hazy opacification of the left hemithorax, likely layering effusion  CT chest abdomen and pelvis shows patchy airspace opacities at the lung bases, liekly reflecting atelectasis vs pneumonia  · Patient was tried on Nasal cannula but failed and placed on BiPap in ER at Ipap 15, Epap 5, rate 15, 60% FiO2  Will continue Bipap for now  Aim for QHS with attempt to wean off during the day  · Will hold fluids a the present time as patient is on diet and possibility of overload  · Echo ordered for tomorrow given miles rales heard on examination however patient also seen laying flat and appears euvolemic in ER with no edema  Lasix as needed  · Strep and Legionella urinary antigen ordered  Sputum culture  · Will continue on IV Rocephin and Azithromycin for suspected Community acquired pneumonia  · Bilateral lower extremity duplex ordered  Will hold off on CTA chest given ELSY for now

## 2021-02-08 NOTE — ED PROVIDER NOTES
History  Chief Complaint   Patient presents with    Shortness of Breath     Per pt brother, pt has been SOB for several months  PT states that he has been SOB since he put the mask on     Head Injury     Pt ran into a beam several days ago and per pt brother he hasn't been right since     Pt in the ER with his brother, with c/o increasing SOB  Pt arrived in moderate respiratory distress, pulse ox of 66% and mildly confused  Pt also with facial cellulitis and crusting to both eyes, which began 2 days ago, after he ran into a beam in his trailer home  He denies chest pain/cough/fevers/chills  Pt has a hx of DM  Also hx of colon and kidney cancer, treated and in remission  History provided by:  Patient  History limited by:  Acuity of condition and severe respiratory distress   used: No    Shortness of Breath  Severity:  Moderate  Onset quality:  Gradual  Timing:  Constant  Progression:  Worsening  Chronicity:  Recurrent  Relieved by:  None tried  Worsened by:  Deep breathing, movement and activity  Ineffective treatments:  None tried  Associated symptoms: no abdominal pain, no chest pain, no cough, no fever, no rash, no vomiting and no wheezing    Risk factors: obesity        Prior to Admission Medications   Prescriptions Last Dose Informant Patient Reported? Taking? Cholecalciferol ([employer] VIT D 5000 HIGH-POTENCY) 5000 UNITS capsule   Yes No   Sig: Take 1,000 Units by mouth daily   insulin NPH-insulin regular (NovoLIN 70/30) 100 units/mL subcutaneous injection   Yes No   Sig: Inject 56 Units under the skin 2 (two) times a day before meals Takes 56 units a m   And 46-56 units hs according to accu check      Facility-Administered Medications: None       Past Medical History:   Diagnosis Date    Cancer [facility])     Chronic kidney disease     Colon cancer ([facility] )     s/p colon resection 9/2016    [facility]     Hypertension     Kidney lesion     mass of left kidney    Obesity Past Surgical History:   Procedure Laterality Date    COLON SURGERY      COLON SURGERY      for ca of colon 9/16/16    COLONOSCOPY      NEPHRECTOMY Left 10/27/2016    Procedure: PARTIAL NEPHRECTOMY ;  Surgeon: Brandi Key MD;  Location: [address] [place] [address];  Service:        History reviewed  No pertinent family history  I have reviewed and agree with the history as documented  E-Cigarette/Vaping     E-Cigarette/Vaping Substances     Social History     Tobacco Use    Smoking status: Never Smoker    Smokeless tobacco: Never Used   Substance Use Topics    Alcohol use: No    Drug use: No       Review of Systems   Constitutional: Negative for chills and fever  HENT: Negative for facial swelling and trouble swallowing  Eyes: Negative for photophobia, pain and redness  Respiratory: Positive for shortness of breath  Negative for cough and wheezing  Cardiovascular: Positive for leg swelling  Negative for chest pain and palpitations  Gastrointestinal: Negative for abdominal pain, constipation, diarrhea, nausea and vomiting  Genitourinary: Negative for dysuria, flank pain, hematuria and urgency  Musculoskeletal: Negative for back pain  Skin: Negative for color change and rash  Neurological: Negative for speech difficulty, weakness and light-headedness  Psychiatric/Behavioral: Negative for agitation and confusion  The patient is nervous/anxious  All other systems reviewed and are negative  Physical Exam  Physical Exam  Vitals signs and nursing note reviewed  Constitutional:       Appearance: He is well-developed  HENT:      Head: Normocephalic and atraumatic  Eyes:      Extraocular Movements: Extraocular movements intact  Pupils: Pupils are equal, round, and reactive to light  Cardiovascular:      Rate and Rhythm: Normal rate and regular rhythm  Heart sounds: Normal heart sounds     Pulmonary:      Effort: Tachypnea, accessory muscle usage and respiratory distress present  Breath sounds: Examination of the right-lower field reveals rales  Examination of the left-lower field reveals rales  Rales present  Abdominal:      General: Bowel sounds are normal  There is no distension  Palpations: Abdomen is soft  There is no mass  Tenderness: There is no abdominal tenderness  There is no guarding or rebound  Musculoskeletal:      Right lower leg: He exhibits no tenderness  Edema present  Left lower leg: He exhibits no tenderness  Edema present  Skin:     General: Skin is warm and dry  Capillary Refill: Capillary refill takes less than 2 seconds  Neurological:      General: No focal deficit present  Mental Status: He is alert and oriented to person, place, and time  Psychiatric:         Mood and Affect: Mood is anxious  Behavior: Behavior normal          Thought Content:  Thought content normal          Judgment: Judgment normal          Vital Signs  ED Triage Vitals   Temperature Pulse Respirations Blood Pressure SpO2   02/08/21 1127 02/08/21 1127 02/08/21 1127 02/08/21 1127 02/08/21 1127   (!) 100 7 °F (38 2 °C) (!) 114 (!) 30 162/76 (!) 67 %      Temp Source Heart Rate Source Patient Position - Orthostatic VS BP Location FiO2 (%)   02/08/21 1127 02/08/21 1127 02/08/21 1127 02/08/21 1127 02/08/21 1600   Tympanic Monitor Lying Left arm 60      Pain Score       02/08/21 1353       Med Not Given for Pain - for MAR use only           Vitals:    02/08/21 1515 02/08/21 1530 02/08/21 1545 02/08/21 1600   BP: 116/56  104/53    Pulse: 96 96 (!) 108 92   Patient Position - Orthostatic VS:             Visual Acuity      ED Medications  Medications   cefTRIAXone (ROCEPHIN) IVPB (premix in dextrose) 1,000 mg 50 mL (0 mg Intravenous Stopped 2/8/21 1224)   azithromycin (ZITHROMAX) 500 mg in sodium chloride 0 9% 250mL IVPB 500 mg (0 mg Intravenous Stopped 2/8/21 1325)   acetaminophen (TYLENOL) tablet 650 mg (650 mg Oral Given 2/8/21 1353)   furosemide (LASIX) injection 40 mg (40 mg Intravenous Given 2/8/21 1314)   iohexol (OMNIPAQUE) 350 MG/ML injection (SINGLE-DOSE) 100 mL (100 mL Intravenous Given 2/8/21 1422)       Diagnostic Studies  Results Reviewed     Procedure Component Value Units Date/Time    Blood culture #1 [[id number]] Collected: 02/08/21 1134    Lab Status: Preliminary result Specimen: Blood from Hand, Left Updated: 02/08/21 1504     Blood Culture Received in Microbiology Lab  Culture in Progress  Blood culture #2 [[id number]] Collected: 02/08/21 1134    Lab Status: Preliminary result Specimen: Blood from Arm, Right Updated: 02/08/21 1504     Blood Culture Received in Microbiology Lab  Culture in Progress  COVID19, Influenza A/B, RSV PCR, [facility] [[id number]]  (Normal) Collected: 02/08/21 1338    Lab Status: Final result Specimen: Nares from Nasopharyngeal Swab Updated: 02/08/21 1426     SARS-CoV-2 Negative     INFLUENZA A PCR Negative     INFLUENZA B PCR Negative     RSV PCR Negative    Narrative: This test has been authorized by FDA under an EUA (Emergency Use Assay) for use by authorized laboratories  Clinical caution and judgement should be used with the interpretation of these results with consideration of the clinical impression and other laboratory testing  Testing reported as "Positive" or "Negative" has been proven to be accurate according to standard laboratory validation requirements  All testing is performed with control materials showing appropriate reactivity at standard intervals  Lactic acid 2 Hours [[id number]]  (Normal) Collected: 02/08/21 1345    Lab Status: Final result Specimen: Blood from Arm, Left Updated: 02/08/21 1409     LACTIC ACID 1 4 mmol/L     Narrative:      Result may be elevated if tourniquet was used during collection      Blood gas, arterial [[id number]]  (Abnormal) Collected: 02/08/21 1240    Lab Status: Final result Specimen: Blood, Arterial from Radial, Left Updated: 02/08/21 1301     pH, Arterial 7 284 [place] [address] ART TC 7 288     pCO2, Arterial 65 7 mm Hg      PCO2 (TC) Arterial 64 8 mm Hg      pO2, Arterial 94 5 mm Hg      PO2 (TC) Arterial 92 8 mm Hg      HCO3, Arterial 30 5 mmol/L      Base Excess, Arterial 1 8 mmol/L      O2 Content, Arterial 96 0 mL/dL      O2 HGB,Arterial  94 3 %      SOURCE Radial, Left     BARTOLO TEST Yes     Temperature 98 Degrees Fehrenheit      Non Vent type BIPAP BIPAP     IPAP 16     EPAP 5     BIPAP fio2 60 %     Lactic acid [[id number]]  (Abnormal) Collected: 02/08/21 1134    Lab Status: Final result Specimen: Blood from Arm, Right Updated: 02/08/21 1217     LACTIC ACID 2 2 mmol/L     Narrative:      Result may be elevated if tourniquet was used during collection  CBC and differential [[id number]]  (Abnormal) Collected: 02/08/21 1134    Lab Status: Final result Specimen: Blood from Arm, Right Updated: 02/08/21 1211     WBC 13 47 Thousand/uL      RBC 7 96 Million/uL      Hemoglobin 15 1 g/dL      Hematocrit 54 5 %      MCV 69 fL      MCH 19 0 pg      MCHC 27 7 g/dL      RDW 19 8 %      MPV 9 0 fL      Platelets 970 Thousands/uL      nRBC 0 /100 WBCs      Neutrophils Relative 90 %      Immat GRANS % 0 %      Lymphocytes Relative 2 %      Monocytes Relative 8 %      Eosinophils Relative 0 %      Basophils Relative 0 %      Neutrophils Absolute 12 05 Thousands/µL      Immature Grans Absolute 0 06 Thousand/uL      Lymphocytes Absolute 0 28 Thousands/µL      Monocytes Absolute 1 05 Thousand/µL      Eosinophils Absolute 0 00 Thousand/µL      Basophils Absolute 0 03 Thousands/µL     Narrative: This is an appended report  These results have been appended to a previously verified report      Troponin I [[id number]]  (Abnormal) Collected: 02/08/21 1134    Lab Status: Final result Specimen: Blood from Arm, Right Updated: 02/08/21 1209     Troponin I 0 10 ng/mL     NT-BNP PRO [[id number]]  (Abnormal) Collected: 02/08/21 1134    Lab Status: Final result Specimen: Blood from Arm, Right Updated: 02/08/21 1209     NT-proBNP 1,624 pg/mL     Comprehensive metabolic panel [[id number]]  (Abnormal) Collected: 02/08/21 1134    Lab Status: Final result Specimen: Blood from Arm, Right Updated: 02/08/21 1209     Sodium 136 mmol/L      Potassium 5 1 mmol/L      Chloride 98 mmol/L      CO2 33 mmol/L      ANION GAP 5 mmol/L      BUN 40 mg/dL      Creatinine 1 88 mg/dL      Glucose 192 mg/dL      Calcium 8 7 mg/dL      Corrected Calcium 9 6 mg/dL      AST 41 U/L      ALT 28 U/L      Alkaline Phosphatase 86 U/L      Total Protein 7 6 g/dL      Albumin 2 9 g/dL      Total Bilirubin 0 80 mg/dL      eGFR 35 ml/min/1 73sq m     Narrative:      [place] guidelines for Chronic Kidney Disease (CKD):     Stage 1 with normal or high GFR (GFR > 90 mL/min/1 73 square meters)    Stage 2 Mild CKD (GFR = 60-89 mL/min/1 73 square meters)    Stage 3A Moderate CKD (GFR = 45-59 mL/min/1 73 square meters)    Stage 3B Moderate CKD (GFR = 30-44 mL/min/1 73 square meters)    Stage 4 Severe CKD (GFR = 15-29 mL/min/1 73 square meters)    Stage 5 End Stage CKD (GFR <15 mL/min/1 73 square meters)  Note: GFR calculation is accurate only with a steady state creatinine    C-reactive protein [[id number]]  (Abnormal) Collected: 02/08/21 1134    Lab Status: Final result Specimen: Blood from Arm, Right Updated: 02/08/21 1208      0 mg/L     Magnesium [[id number]]  (Normal) Collected: 02/08/21 1134    Lab Status: Final result Specimen: Blood from Arm, Right Updated: 02/08/21 1208     Magnesium 1 9 mg/dL     Ammonia [[id number]]  (Abnormal) Collected: 02/08/21 1134    Lab Status: Final result Specimen: Blood from Arm, Right Updated: 02/08/21 1203     Ammonia <10 umol/L     APTT [[id number]]  (Normal) Collected: 02/08/21 1134    Lab Status: Final result Specimen: Blood from Arm, Right Updated: 02/08/21 1159     PTT 33 seconds     Protime-INR [[id number]]  (Abnormal) Collected: 02/08/21 6075    Lab Status: Final result Specimen: Blood from Arm, Right Updated: 02/08/21 1159     Protime 14 6 seconds      INR 1 15    Sedimentation rate, automated [[id number]]  (Abnormal) Collected: 02/08/21 1134    Lab Status: Final result Specimen: Blood from Arm, Right Updated: 02/08/21 1143     Sed Rate 80 mm/hour     Narrative:      New method- Test performed using  automated Rheology Technology  If following a patient's inflammatory disease during treatment, a new baseline should be established  UA (URINE) with reflex to Scope [[id number]]     Lab Status: No result Specimen: Urine                  CT head without contrast   Final Result by Deborah Guadalupe MD (02/08 1446)      No acute intracranial abnormality  Workstation performed: [id number]         CT facial bones with contrast   Final Result by Deborah Guadalupe MD (02/08 1452)      No acute osseous abnormality in the facial bones  Workstation performed: [id number]         CT chest abdomen pelvis w contrast   Final Result by Deborah Guadalupe MD (02/08 1501)      1  No findings of acute traumatic injury in the chest, abdomen or pelvis  2   Patchy airspace opacities at the lung bases, likely reflecting atelectasis versus pneumonia  3   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 and 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 cm in the mid kidney  Due to increased risk of hemorrhage with AML greater than 4 cm, further evaluation with urology consultation is recommended  Workstation performed: [id number]         XR chest 1 view portable   Final Result by Samina Arellano MD (02/08 1326)      Hazy opacification of the left hemithorax, likely layering effusion                    Workstation performed: [id number]                    Procedures  ECG 12 Lead Documentation Only    Date/Time: 2/8/2021 11:17 AM  Performed by: Damián Manning DO  Authorized by: Damián Manning DO     Indications / Diagnosis:  Sob  ECG reviewed by me, the ED Provider: yes    Patient location:  ED  Previous ECG:     Previous ECG:  Unavailable    Comparison to cardiac monitor: Yes    Interpretation:     Interpretation: abnormal    Rate:     ECG rate:  121bpm    ECG rate assessment: tachycardic    Rhythm:     Rhythm: sinus tachycardia    Ectopy:     Ectopy: PVCs      PVCs:  Frequent  QRS:     QRS axis:  Normal  Conduction:     Conduction: normal    ST segments:     ST segments:  Normal  T waves:     T waves: normal      CriticalCare Time  Performed by: Avril Spivey DO  Authorized by: Avril Spivey DO     Critical care provider statement:     Critical care time (minutes):  65    Critical care time was exclusive of:  Separately billable procedures and treating other patients and teaching time    Critical care was necessary to treat or prevent imminent or life-threatening deterioration of the following conditions:  Sepsis, respiratory failure, metabolic crisis and renal failure    Critical care was time spent personally by me on the following activities:  Blood draw for specimens, obtaining history from patient or surrogate, development of treatment plan with patient or surrogate, discussions with consultants, evaluation of patient's response to treatment, examination of patient, interpretation of cardiac output measurements, ordering and performing treatments and interventions, ordering and review of laboratory studies, ordering and review of radiographic studies, re-evaluation of patient's condition and review of old charts    I assumed direction of critical care for this patient from another provider in my specialty: no               ED Course                         Initial Sepsis Screening     [address] Name 02/08/21 1200                Is the patient's history suggestive of a new or worsening infection? (!) Yes (Proceed)  -OO        Suspected source of infection  pneumonia  -OO        Are two or more of the following signs & symptoms of infection both present and new to the patient? (!) Yes (Proceed)  -OO        Indicate SIRS criteria  Tachypnea > 20 resp per min;Leukocytosis (WBC > 23313 IJL); Altered mental status  -OO        If the answer is yes to both questions, suspicion of sepsis is present  --        If severe sepsis is present AND tissue hypoperfusion perists in the hour after fluid resuscitation or lactate > 4, the patient meets criteria for SEPTIC SHOCK  --        Are any of the following organ dysfunction criteria present within 6 hours of suspected infection and SIRS criteria that are NOT considered to be chronic conditions? No  -OO        Organ dysfunction  Lactate > 2 0 mmol/L;Acute respiratory failure (new need for invasive or non-invasive mechanical ventilation)  -OO        Date of presentation of severe sepsis  02/08/21  -OO        Time of presentation of severe sepsis  1200  -OO        Tissue hypoperfusion persists in the hour after crystalloid fluid administration, evidenced, by either:  --        Was hypotension present within one hour of the conclusion of crystalloid fluid administration? No  -OO        Date of presentation of septic shock  --        Time of presentation of septic shock  --          User Key  (r) = Recorded By, (t) = Taken By, (c) = Cosigned By    [address] Name Provider Type    OO Damián Manning DO Physician          SBIRT 20yo+      Most Recent Value   SBIRT (23 yo +)   In order to provide better care to our patients, we are screening all of our patients for alcohol and drug use  Would it be okay to ask you these screening questions?   No Filed at: 02/08/2021 1139                    MDM  Number of Diagnoses or Management Options  ELSY (acute kidney injury) [facility]): new and requires workup  CHF (congestive heart failure) ([facility] ): new and requires workup  NSTEMI (non-ST elevated myocardial infarction) [facility]): new and requires workup  Pneumonia: new and requires workup  Respiratory failure with hypercapnia [facility]): new and requires workup  Severe sepsis [facility]):   Diagnosis management comments: Patient in the emergency room in  moderate respiratory distress  Patient hypoxic on arrival - 66%  Patient placed on BiPAP immediately  Chest x-ray reviewed concerning for pulmonary vascular congestion and left pleural effusion  Patient empirically given a dose of Lasix  Pt met sepsis criteria on initial eval, sepsis alert called, and started on antibiotics  Pt is in acute CHF, IVF bolus for sepsis held with patient's consent  CT obtained, which confirms pneumonia  Attempted to wean patient off BiPAP and placed on nasal cannula, however patient became more hypoxic - 88% on 3 L and 91% on 5 L  Patient also became more tachypneic  BiPAP replaced  Patient will be admitted to ICU  Discussed with ICU advanced practitioner         Amount and/or Complexity of Data Reviewed  Clinical lab tests: ordered and reviewed  Tests in the radiology section of CPT®: ordered and reviewed    Risk of Complications, Morbidity, and/or Mortality  Presenting problems: high  Diagnostic procedures: high  Management options: high    Patient Progress  Patient progress: stable      Disposition  Final diagnoses:   CHF (congestive heart failure) ([facility] )   NSTEMI (non-ST elevated myocardial infarction) ([facility] )   Respiratory failure with hypercapnia ([facility] )   Pneumonia   ELSY (acute kidney injury) ([facility] )   Severe sepsis ([facility] )     Time reflects when diagnosis was documented in both MDM as applicable and the Disposition within this note     Time User Action Codes Description Comment    2/8/2021  3:42 PM Raymondo Bernheim O Add [I50 9] CHF (congestive heart failure) ([facility] )     2/8/2021  3:42 PM Raymondo Bernheim Add [I21 4] NSTEMI (non-ST elevated myocardial infarction) ([facility] )     2/8/2021  3:42 PM Raymondo Bernheim Add [J96 92] Respiratory failure with hypercapnia ([facility] )     2/8/2021  3:43 PM Raymondo Bernheim O Add [J18 9] Pneumonia     2/8/2021  3:43 PM Raymondo Bernheim O Add [N17 9] ELSY (acute kidney injury) ([facility] )     2/8/2021  4:07 PM Radha Herring Add [A41 9,  R69 20] Severe sepsis [facility])       ED Disposition     ED Disposition Condition Date/Time Comment    Admit Stable Mon Feb 8, 2021  3:41 PM Case was discussed with WIL Epperson and the patient's admission status was agreed to be Admission Status: inpatient status to the service of Dr Sven Izquierdo   Follow-up Information    None         Patient's Medications   Discharge Prescriptions    No medications on file     No discharge procedures on file      PDMP Review     None          ED Provider  Electronically Signed by           Kenia De La Cruz DO  02/08/21 9146

## 2021-02-08 NOTE — RESPIRATORY THERAPY NOTE
Patient placed on Bipap per Dr Marylee Anchors  Placed on 14/5 60%  Tolerating well thus far, SpO2 96%  Will monitor  Patient transported to ICU without incident, VSS

## 2021-02-08 NOTE — H&P
Progress Note - Ct Gross 1950, 79 y o  male MRN: [id number]    Unit/Bed#: ICU 02 Encounter: [id number]    Primary Care Provider: Noa Waite MD   Date and time admitted to hospital: 2/8/2021 11:15 AM    Ct Gross is a 79 y o  male with PMH of insulin dependant DM2, Left partial nephrectomy due to left lower pole tumor October 2016, who is admitted with severe sepsis and acute hypoxic respiratory failure requiring BiPap  Patient is admitted under the service of Dr Chris Burger and is expected to stay greater than 2 midnights  Acute respiratory failure with hypoxia [facility])  Assessment & Plan  Patient with no reported past medical hx of COPD, CHF, SUDHAKAR, presents with 5 day hx of shortness of breath which he reports occurred after he tripped and fell in home and hit his head  CT head no acute intracranial abnormality  BNP elevated at 1624, no previous baseline per chart review  COVID 19, Influenza A and B, and RSV PCR Negative  Blood gas in ED shows pH 7 28, pCO2, pO2 92 8, Bicarb 30 5  He was diuresed with 40mg Lasix IV IN ER  CXR shows hazy opacification of the left hemithorax, likely layering effusion  CT chest abdomen and pelvis shows patchy airspace opacities at the lung bases, liekly reflecting atelectasis vs pneumonia  · Patient was tried on Nasal cannula but failed and placed on BiPap in ER at Ipap 15, Epap 5, rate 15, 60% FiO2  Will continue Bipap for now  Aim for QHS with attempt to wean off during the day  · Will hold fluids a the present time as patient is on diet and possibility of overload  · Echo ordered for tomorrow given miles rales heard on examination however patient also seen laying flat and appears euvolemic in ER with no edema  Lasix as needed  · Strep and Legionella urinary antigen ordered  Sputum culture  · Will continue on IV Rocephin and Azithromycin for suspected Community acquired pneumonia  · Bilateral lower extremity duplex ordered   Will hold off on CTA chest given ELSY for now  * Severe sepsis [facility])  Assessment & Plan  Patient presented in respiratory distress with RR of 30pm, , WBC count 13 4 thousand with suspected source of infection being pneumonia with CXR showing hazy opacification of the left hemithorax, likely layering effusion  CT chest abdomen and pelvis shows patchy airspace opacities at the lung bases, liekly reflecting atelectasis vs pneumonia  LA 2 1 with follow up LA normalizing without addition of fluids  BP has been >97 systolic  Troponin elevated 0 10->0  12  Will trend  · Strep and Legionella urinary antigen ordered  Sputum culture  · Will continue on IV Rocephin and Azithromycin for suspected Community acquired pneumonia  · Will trend WBC and monitor vitals  · Procal ordered for AM tomorrow  · Blood cultures x2  and UA        Angiolipoma of left kidney  Assessment & Plan  CT wo contrast Chest abdomen and pelvis shows 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 and 4 1 cm in the mid kidney  Due to increased risk of hemorrhage with AML greater than 4 cm, further evaluation with urology consultation is recommended  Patient also has hx of Left partial nephrectomy in 2016 due to left lower pole tumor in Oct 2016  · Will consult urology, recommendations appreciated  Head injury  Assessment & Plan  Patient reports he had an unwitnessed fall about 5 days ago for which he did not lose consiousness or have a postictal state  CT head wo contrast shows no acute intracranial abnormality  CT facial bones with contrast shows no acute osseous abnormality in the facial bones  · Neuro checks Q4H  Type 2 diabetes mellitus ([facility] [address] )  Assessment & Plan  No results found for: HGBA1C    No results for input(s): POCGLU in the last 72 hours  Blood Sugar Average: Last 72 hrs:  Patient at home regimen takes 56U of Novolin 70/30 BID before meals    · Will check HBA1c, none per chart review  · Will start patient on Insulin sliding scale and after monitoring insulin requirement while inpatient will start on Basal insulin QHS  · Alessandro/ carb controled level 2 diet  · Blood sugar checks before meals and at bedtime  ELSY (acute kidney injury) ([facility] [address] )  Assessment & Plan  Patient's Cr on admission 1 88  Baseline Cr not well known given last Cr in 2016 possible baseline about 1 0-1 1  · Avoid nephrotoxic medications  · Currently not on fluids given possible CHF exacerbation given BNP 1624 with no previous lab work per chart, Diuresed 40mg IV lasix in ED  Tolerating Diet  · Daily BMP and monitor Cr  Will await Echocardiogram results  -------------------------------------------------------------------------------------------------------------  Chief Complaint: Shortness of Breath    History of Present Illness     Mundo Rosas is a 79 y o  male with PMH of insulin dependant DM2, Left partial nephrectomy due to left lower pole tumor October 2016, and unkown GI malignancy s/p several surgeries currently self reported as in remission, who presents with shortness of breath x 5 days  Patient reports symptoms began after fall where he tripped and fell on face with no loss of consciousness, no posticatil state, and no witnesses  He denies use tobacco dependance although he used to smoke for about 15 years 2 PPD  No hx of COPD, SUDHAKAR  He denies chest pain, abdominal pain, headache, vision changes, dizziness or light headedness, constipation or diarrhea, dysuria or hematuria, hematochezia  He is currently seen on Bipap and reports his breathing is significantly improved setting of Ipap 15, Epap 5, rate 15, 60% FiO2  ED course: Ceftriaxone 1g IV, Azithromycin 500mg IV, Acetaminophen 650mg PO, Lasix 40mg IV x1  History obtained from the patient   -------------------------------------------------------------------------------------------------------------  Dispo:  Admit to Critical Care     Code Status: Prior  --------------------------------------------------------------------------------------------------------------  Review of Systems    A 12-point, complete review of systems was reviewed and negative except as stated above     Physical Exam  Constitutional:       General: He is in acute distress  Appearance: He is obese  He is ill-appearing  Cardiovascular:      Rate and Rhythm: Regular rhythm  Tachycardia present  Pulses: Normal pulses  Heart sounds: Normal heart sounds  Pulmonary:      Effort: Pulmonary effort is normal       Breath sounds: Rales (Rigth lower lobe rales on auscultation  Diffiicult to auscultate remainder of lung fields ) present  Comments: Currently on BiPap Ipap 15, EPap 5, rate 15, FiO2 60%  Abdominal:      General: Bowel sounds are normal  There is distension  Palpations: Abdomen is soft  Tenderness: There is no abdominal tenderness  There is no guarding  Musculoskeletal:      Right lower leg: No edema  Left lower leg: No edema  Comments: Negative for calf tenderness on palpation, negative Frankie's sign bilaterally  Skin:     General: Skin is warm  Capillary Refill: Capillary refill takes less than 2 seconds  Comments: Very poor hygiene with crusting of bilateral eyelids together along with sticky discharge  - dry skin chronic eczematous seen bilaterally covering extensive amount of body     -Small skin excoriation/ bruise over nose but difficult to see with BiPap on  Neurological:      Mental Status: He is alert and oriented to person, place, and time         --------------------------------------------------------------------------------------------------------------  Vitals:   Vitals:    02/08/21 1600 02/08/21 1615 02/08/21 1649 02/08/21 1700   BP:    114/75   BP Location:       Pulse: 92 92  91   Resp: (!) 28 (!) 28  (!) 33   Temp:  98 9 °F (37 2 °C)  98 7 °F (37 1 °C)   TempSrc:  Tympanic  Temporal   SpO2: 94% 96% 95% 94% Weight:    124 kg (272 lb 11 3 oz)   Height:    5' 7" (1 702 m)     Temp  Min: 98 7 °F (37 1 °C)  Max: 100 7 °F (38 2 °C)  IBW: 66 1 kg  Height: 5' 7" (170 2 cm)  Body mass index is 42 71 kg/m²  Laboratory and Diagnostics:  Results from last 7 days   Lab Units 02/08/21  1134   WBC Thousand/uL 13 47*   HEMOGLOBIN g/dL 15 1   HEMATOCRIT % 54 5*   PLATELETS Thousands/uL 304   NEUTROS PCT % 90*   MONOS PCT % 8     Results from last 7 days   Lab Units 02/08/21  1134   SODIUM mmol/L 136   POTASSIUM mmol/L 5 1   CHLORIDE mmol/L 98*   CO2 mmol/L 33*   ANION GAP mmol/L 5   BUN mg/dL 40*   CREATININE mg/dL 1 88*   CALCIUM mg/dL 8 7   GLUCOSE RANDOM mg/dL 192*   ALT U/L 28   AST U/L 41   ALK PHOS U/L 86   ALBUMIN g/dL 2 9*   TOTAL BILIRUBIN mg/dL 0 80     Results from last 7 days   Lab Units 02/08/21  1134   MAGNESIUM mg/dL 1 9      Results from last 7 days   Lab Units 02/08/21  1134   INR  1 15   PTT seconds 33      Results from last 7 days   Lab Units 02/08/21  1632 02/08/21  1134   TROPONIN I ng/mL 0 12* 0 10*     Results from last 7 days   Lab Units 02/08/21  1345 02/08/21  1134   LACTIC ACID mmol/L 1 4 2 2*     ABG:  Results from last 7 days   Lab Units 02/08/21  1240   PH ART  7 284*   PCO2 ART mm Hg 65 7*   PO2 ART mm Hg 94 5   HCO3 ART mmol/L 30 5*   BASE EXC ART mmol/L 1 8   ABG SOURCE  Radial, Left     VBG:  Results from last 7 days   Lab Units 02/08/21  1240   ABG SOURCE  Radial, Left           Micro:  Results from last 7 days   Lab Units 02/08/21  1134   BLOOD CULTURE  Received in Microbiology Lab  Culture in Progress  Received in Microbiology Lab  Culture in Progress  EKG: Sinus Tachy with occasional premature ventricular complexes, rate 121, Qtc 462  Imaging: I have personally reviewed pertinent reports          Historical Information   Past Medical History:   Diagnosis Date    Cancer [facility])     Chronic kidney disease     Colon cancer ([facility] [address] )     s/p colon resection 9/2016    Diabetes mellitus ([address] )     Hypertension     Kidney lesion     mass of left kidney    Obesity      Past Surgical History:   Procedure Laterality Date    COLON SURGERY      COLON SURGERY      for ca of colon 9/16/16    COLONOSCOPY      NEPHRECTOMY Left 10/27/2016    Procedure: PARTIAL NEPHRECTOMY ;  Surgeon: Trevor Saavedra MD;  Location: [address] [place] [address];  Service:      Social History   Social History     Substance and Sexual Activity   Alcohol Use No     Social History     Substance and Sexual Activity   Drug Use No     Social History     Tobacco Use   Smoking Status Never Smoker   Smokeless Tobacco Never Used       Family History:  Patient denies any family hx of GI cancer or any other malignancy  History reviewed  No pertinent family history  I have reviewed this patient's family history and commented on sigificant items within the HPI      Medications:  Current Facility-Administered Medications   Medication Dose Route Frequency    [START ON 2/9/2021] azithromycin (ZITHROMAX) 500 mg in sodium chloride 0 9% 250mL IVPB 500 mg  500 mg Intravenous Q24H    [START ON 2/9/2021] cefTRIAXone (ROCEPHIN) IVPB (premix in dextrose) 1,000 mg 50 mL  1,000 mg Intravenous Q24H     Home medications:  Prior to Admission Medications   Prescriptions Last Dose Informant Patient Reported? Taking? Cholecalciferol ([employer] VIT D 5000 HIGH-POTENCY) 5000 UNITS capsule Past Week at Unknown time  Yes Yes   Sig: Take 1,000 Units by mouth daily   insulin NPH-insulin regular (NovoLIN 70/30) 100 units/mL subcutaneous injection Past Week at Unknown time  Yes Yes   Sig: Inject 56 Units under the skin 2 (two) times a day before meals Takes 56 units a m  And 46-56 units hs according to accu check      Facility-Administered Medications: None     Allergies:   Allergies   Allergen Reactions    Shellfish-Derived Products        ------------------------------------------------------------------------------------------------------------  Advance Directive and Living Will:      Power of :    POLST:    ------------------------------------------------------------------------------------------------------------  Anticipated Length of Stay is > 2 midnights    Care Time Delivered:   No Critical Care time spent       Bonita Boyd MD        Portions of the record may have been created with voice recognition software  Occasional wrong word or "sound a like" substitutions may have occurred due to the inherent limitations of voice recognition software    Read the chart carefully and recognize, using context, where substitutions have occurred

## 2021-02-08 NOTE — ASSESSMENT & PLAN NOTE
Patient reports he had an unwitnessed fall about 5 days ago for which he did not lose consiousness or have a postictal state  CT head wo contrast shows no acute intracranial abnormality  CT facial bones with contrast shows no acute osseous abnormality in the facial bones  · Neuro checks Q4H

## 2021-02-08 NOTE — ASSESSMENT & PLAN NOTE
CT wo contrast Chest abdomen and pelvis shows 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 and 4 1 cm in the mid kidney  Due to increased risk of hemorrhage with AML greater than 4 cm, further evaluation with urology consultation is recommended  Patient also has hx of Left partial nephrectomy in 2016 due to left lower pole tumor in Oct 2016  · Will consult urology, recommendations appreciated

## 2021-02-08 NOTE — ED NOTES
Pt not tolerating nasal canula, put back on Bi-Pap  Physician aware       Vanessa Harrell, JENNY  02/08/21 1600

## 2021-02-08 NOTE — ASSESSMENT & PLAN NOTE
No results found for: HGBA1C    No results for input(s): POCGLU in the last 72 hours  Blood Sugar Average: Last 72 hrs:  Patient at home regimen takes 56U of Novolin 70/30 BID before meals  · Will check HBA1c, none per chart review  · Will start patient on Insulin sliding scale and after monitoring insulin requirement while inpatient will start on Basal insulin QHS  · Alessandro/ carb controled level 2 diet  · Blood sugar checks before meals and at bedtime

## 2021-02-08 NOTE — SEPSIS NOTE
Sepsis Note   Isabela Kulkarni 79 y o  male MRN: [id number]  Unit/Bed#: ED 06 Encounter: [id number]      qSOFA     Row Name 02/08/21 1600 02/08/21 1545 02/08/21 1515 02/08/21 1500 02/08/21 1445    Altered mental status GCS < 15  --  --  --  --  --    Respiratory Rate > / =22  1  --  --  1  1    Systolic BP < / =977  --  0  0  0  0    Q Sofa Score  1  1  1  1  1    Row Name 02/08/21 1400 02/08/21 1345 02/08/21 1330 02/08/21 1230 02/08/21 1141    Altered mental status GCS < 15  --  --  --  --  --    Respiratory Rate > / =22  1  1  1  1  1    Systolic BP < / =204  0  0  0  0  --    Q Sofa Score  1  1  1  1  1    Row Name 02/08/21 1127                Altered mental status GCS < 15  --        Respiratory Rate > / =08  1        Systolic BP < / =250  0        Q Sofa Score  1            Initial Sepsis Screening     Row Name 02/08/21 1200                Is the patient's history suggestive of a new or worsening infection? (!) Yes (Proceed)  -OO        Suspected source of infection  pneumonia  -OO        Are two or more of the following signs & symptoms of infection both present and new to the patient? (!) Yes (Proceed)  -OO        Indicate SIRS criteria  Tachypnea > 20 resp per min;Leukocytosis (WBC > 08746 IJL); Altered mental status  -OO        If the answer is yes to both questions, suspicion of sepsis is present  --        If severe sepsis is present AND tissue hypoperfusion perists in the hour after fluid resuscitation or lactate > 4, the patient meets criteria for SEPTIC SHOCK  --        Are any of the following organ dysfunction criteria present within 6 hours of suspected infection and SIRS criteria that are NOT considered to be chronic conditions?   No  -OO        Organ dysfunction  Lactate > 2 0 mmol/L;Acute respiratory failure (new need for invasive or non-invasive mechanical ventilation)  -OO        Date of presentation of severe sepsis  02/08/21  -OO        Time of presentation of severe sepsis  1200  -OO Tissue hypoperfusion persists in the hour after crystalloid fluid administration, evidenced, by either:  --        Was hypotension present within one hour of the conclusion of crystalloid fluid administration?   No  -OO        Date of presentation of septic shock  --        Time of presentation of septic shock  --          User Key  (r) = Recorded By, (t) = Taken By, (c) = Cosigned By    [address] Name Provider Type    OO DO Georgiana Daniels

## 2021-02-08 NOTE — ASSESSMENT & PLAN NOTE
Patient presented in respiratory distress with RR of 30pm, , WBC count 13 4 thousand with suspected source of infection being pneumonia with CXR showing hazy opacification of the left hemithorax, likely layering effusion  CT chest abdomen and pelvis shows patchy airspace opacities at the lung bases, liekly reflecting atelectasis vs pneumonia  LA 2 1 with follow up LA normalizing without addition of fluids  BP has been >12 systolic  Troponin elevated 0 10->0  12  Will trend  · Strep and Legionella urinary antigen ordered  Sputum culture  · Will continue on IV Rocephin and Azithromycin for suspected Community acquired pneumonia  · Will trend WBC and monitor vitals  · Procal ordered for AM tomorrow     · Blood cultures x2  and UA

## 2021-02-08 NOTE — CONSULTS
H&P Exam - Urology       Patient: Domo Concepcion   : 1950 Sex: male   MRN: [id number]     CSN: [id number]      History of Present Illness   HPI:  Domo Concepcion is a 79 y o  male well known to me with history of left lower pole renal carcinoma undergoing left partial nephrectomy in  presenting to [address] [place] [address] ER with shortness of breath found to be in respiratory distress placed in the ICU seen at the bedside at this time with a 4 7 cm left angiomyolipoma  Patient has had no voiding difficulties episodes of gross hematuria  Review of Systems:   Constitutional:  Negative for activity change, fever, chills and diaphoresis  HENT: Negative for hearing loss and trouble swallowing  Eyes: Negative for itching and visual disturbance  Respiratory: Negative for chest tightness and shortness of breath  Cardiovascular: Negative for chest pain, edema  Gastrointestinal: Negative for abdominal distention, na abdominal pain, constipation, diarrhea, Nausea and vomiting  Genitourinary: Negative for decreased urine volume, difficulty urinating, dysuria, enuresis, frequency, hematuria and urgency  Musculoskeletal: Negative for gait problem and myalgias  Neurological: Negative for dizziness and headaches  Hematological: Does not bruise/bleed easily         Historical Information   Past Medical History:   Diagnosis Date    Cancer [facility])     Chronic kidney disease     Colon cancer ([facility] [address] )     s/p colon resection 2016    Diabetes mellitus ([facility] [address] )     Hypertension     Kidney lesion     mass of left kidney    Obesity      Past Surgical History:   Procedure Laterality Date    COLON SURGERY      COLON SURGERY      for ca of colon 16    COLONOSCOPY      NEPHRECTOMY Left 10/27/2016    Procedure: PARTIAL NEPHRECTOMY ;  Surgeon: Caroline Mcbride MD;  Location: [facility];  Service:      Social History   Social History     Substance and Sexual Activity   Alcohol Use Never    Frequency: Never Social History     Substance and Sexual Activity   Drug Use No     Social History     Tobacco Use   Smoking Status Never Smoker   Smokeless Tobacco Never Used     Family History: History reviewed  No pertinent family history  Meds/Allergies   Medications Prior to Admission   Medication    Cholecalciferol ([employer] VIT D 5000 HIGH-POTENCY) 5000 UNITS capsule    insulin NPH-insulin regular (NovoLIN 70/30) 100 units/mL subcutaneous injection     Allergies   Allergen Reactions    Shellfish-Derived Products        Objective   Vitals: /75   Pulse 91   Temp 98 7 °F (37 1 °C) (Temporal)   Resp (!) 33   Ht 5' 7" (1 702 m)   Wt 124 kg (272 lb 11 3 oz)   SpO2 94%   BMI 42 71 kg/m²     Physical Exam:  General Alert awake   Normocephalic atraumatic PERRLA  Lungs clear bilaterally  Cardiac normal S1 normal S2  Abdomen soft, flank pain none  Obese abdomen  Weber draining clear urine  Two normal testicles  Rectal exam deferred  Extremities no edema    I/O last 24 hours:   In: 48 [IV Piggyback:50]  Out: -     Invasive Devices     Peripheral Intravenous Line            Peripheral IV 02/08/21 Left Wrist less than 1 day    Peripheral IV 02/08/21 Right Forearm less than 1 day          Drain            External Urinary Catheter Small less than 1 day                    Lab Results: CBC:   Lab Results   Component Value Date    WBC 13 47 (H) 02/08/2021    HGB 15 1 02/08/2021    HCT 54 5 (H) 02/08/2021    MCV 69 (L) 02/08/2021     02/08/2021    MCH 19 0 (L) 02/08/2021    MCHC 27 7 (L) 02/08/2021    RDW 19 8 (H) 02/08/2021    MPV 9 0 02/08/2021    NRBC 0 02/08/2021     CMP:   Lab Results   Component Value Date    CL 98 (L) 02/08/2021    CO2 33 (H) 02/08/2021    BUN 40 (H) 02/08/2021    CREATININE 1 88 (H) 02/08/2021    CALCIUM 8 7 02/08/2021    AST 41 02/08/2021    ALT 28 02/08/2021    ALKPHOS 86 02/08/2021    EGFR 35 02/08/2021     Urinalysis:   Lab Results   Component Value Date    COLORU Yellow 10/30/2016 CLARITYU Clear 10/30/2016    SPECGRAV 1 020 10/30/2016    PHUR 6 5 10/30/2016    LEUKOCYTESUR Negative 10/30/2016    NITRITE Negative 10/30/2016    GLUCOSEU 100 (1/10%) (A) 10/30/2016    KETONESU Trace (A) 10/30/2016    BILIRUBINUR Negative 10/30/2016    BLOODU Large (A) 10/30/2016     Urine Culture:   Lab Results   Component Value Date    URINECX No Growth <1000 cfu/mL 10/31/2016     PSA: No results found for: PSA        Assessment/ Plan:  History of left lower pole renal cell carcinoma status post left partial nephrectomy  Left 4 7 cm angiomyolipoma no symptoms  Renal failure  Respiratory distress acute respiratory failure  Patient normally seen at 6 month intervals in the office setting  Watching his left angiomyolipoma  If bleeding occurs  Could consider IR intervention to avoid left nephrectomy in light of renal failure        Drew Michael MD

## 2021-02-09 PROBLEM — R78.81 BACTEREMIA DUE TO STAPHYLOCOCCUS: Status: ACTIVE | Noted: 2021-01-01

## 2021-02-09 PROBLEM — L53.9 FACIAL ERYTHEMA: Status: ACTIVE | Noted: 2021-01-01

## 2021-02-09 PROBLEM — B95.8 BACTEREMIA DUE TO STAPHYLOCOCCUS: Status: ACTIVE | Noted: 2021-01-01

## 2021-02-09 NOTE — PLAN OF CARE
----- Message from Abisai Hernandez MD sent at 12/15/2019  9:13 AM CST -----  Call patient.  Blood sugar is slightly elevated at 122.  You need to reduce your carbs including bread and rice in your diet.  Kidneys and liver looked stable.  Calcium is just slightly elevated but very stable.  Continue current medications   Problem: Potential for Falls  Goal: Patient will remain free of falls  Description: INTERVENTIONS:  - Assess patient frequently for physical needs  -  Identify cognitive and physical deficits and behaviors that affect risk of falls    -  [place] fall precautions as indicated by assessment   - Educate patient/family on patient safety including physical limitations  - Instruct patient to call for assistance with activity based on assessment  - Modify environment to reduce risk of injury  - Consider OT/PT consult to assist with strengthening/mobility  Outcome: Progressing     Problem: Prexisting or High Potential for Compromised Skin Integrity  Goal: Skin integrity is maintained or improved  Description: INTERVENTIONS:  - Identify patients at risk for skin breakdown  - Assess and monitor skin integrity  - Assess and monitor nutrition and hydration status  - Monitor labs   - Assess for incontinence   - Turn and reposition patient  - Assist with mobility/ambulation  - Relieve pressure over bony prominences  - Avoid friction and shearing  - Provide appropriate hygiene as needed including keeping skin clean and dry  - Evaluate need for skin moisturizer/barrier cream  - Collaborate with interdisciplinary team   - Patient/family teaching  - Consider wound care consult   Outcome: Progressing     Problem: PAIN - ADULT  Goal: Verbalizes/displays adequate comfort level or baseline comfort level  Description: Interventions:  - Encourage patient to monitor pain and request assistance  - Assess pain using appropriate pain scale  - Administer analgesics based on type and severity of pain and evaluate response  - Implement non-pharmacological measures as appropriate and evaluate response  - Consider cultural and social influences on pain and pain management  - Notify physician/advanced practitioner if interventions unsuccessful or patient reports new pain  Outcome: Progressing     Problem: INFECTION - ADULT  Goal: Absence or prevention of progression during hospitalization  Description: INTERVENTIONS:  - Assess and monitor for signs and symptoms of infection  - Monitor lab/diagnostic results  - Monitor all insertion sites, i e  indwelling lines, tubes, and drains  - Monitor endotracheal if appropriate and nasal secretions for changes in amount and color  - [place] appropriate cooling/warming therapies per order  - Administer medications as ordered  - Instruct and encourage patient and family to use good hand hygiene technique  - Identify and instruct in appropriate isolation precautions for identified infection/condition  Outcome: Progressing     Problem: SAFETY ADULT  Goal: Patient will remain free of falls  Description: INTERVENTIONS:  - Assess patient frequently for physical needs  -  Identify cognitive and physical deficits and behaviors that affect risk of falls    -  [place] fall precautions as indicated by assessment   - Educate patient/family on patient safety including physical limitations  - Instruct patient to call for assistance with activity based on assessment  - Modify environment to reduce risk of injury  - Consider OT/PT consult to assist with strengthening/mobility  Outcome: Progressing  Goal: Maintain or return to baseline ADL function  Description: INTERVENTIONS:  -  Assess patient's ability to carry out ADLs; assess patient's baseline for ADL function and identify physical deficits which impact ability to perform ADLs (bathing, care of mouth/teeth, toileting, grooming, dressing, etc )  - Assess/evaluate cause of self-care deficits   - Assess range of motion  - Assess patient's mobility; develop plan if impaired  - Assess patient's need for assistive devices and provide as appropriate  - Encourage maximum independence but intervene and supervise when necessary  - Involve family in performance of ADLs  - Assess for home care needs following discharge   - Consider OT consult to assist with ADL evaluation and planning for discharge  - Provide patient education as appropriate  Outcome: Progressing  Goal: Maintain or return mobility status to optimal level  Description: INTERVENTIONS:  - Assess patient's baseline mobility status (ambulation, transfers, stairs, etc )    - Identify cognitive and physical deficits and behaviors that affect mobility  - Identify mobility aids required to assist with transfers and/or ambulation (gait belt, sit-to-stand, lift, walker, cane, etc )  - [place] fall precautions as indicated by assessment  - Record patient progress and toleration of activity level on Mobility SBAR; progress patient to next Phase/Stage  - Instruct patient to call for assistance with activity based on assessment  - Consider rehabilitation consult to assist with strengthening/weightbearing, etc   Outcome: Progressing     Problem: DISCHARGE PLANNING  Goal: Discharge to home or other facility with appropriate resources  Description: INTERVENTIONS:  - Identify barriers to discharge w/patient and caregiver  - Arrange for needed discharge resources and transportation as appropriate  - Identify discharge learning needs (meds, wound care, etc )  - Arrange for interpretive services to assist at discharge as needed  - Refer to Case Management Department for coordinating discharge planning if the patient needs post-hospital services based on physician/advanced practitioner order or complex needs related to functional status, cognitive ability, or social support system  Outcome: Progressing     Problem: Knowledge Deficit  Goal: Patient/family/caregiver demonstrates understanding of disease process, treatment plan, medications, and discharge instructions  Description: Complete learning assessment and assess knowledge base    Interventions:  - Provide teaching at level of understanding  - Provide teaching via preferred learning methods  Outcome: Progressing     Problem: RESPIRATORY - ADULT  Goal: Achieves optimal ventilation and oxygenation  Description: INTERVENTIONS:  - Assess for changes in respiratory status  - Assess for changes in mentation and behavior  - Position to facilitate oxygenation and minimize respiratory effort  - Oxygen administered by appropriate delivery if ordered  - Initiate smoking cessation education as indicated  - Encourage broncho-pulmonary hygiene including cough, deep breathe, Incentive Spirometry  - Assess the need for suctioning and aspirate as needed  - Assess and instruct to report SOB or any respiratory difficulty  - Respiratory Therapy support as indicated  Outcome: Progressing

## 2021-02-09 NOTE — PLAN OF CARE
Problem: Potential for Falls  Goal: Patient will remain free of falls  Description: INTERVENTIONS:  - Assess patient frequently for physical needs  -  Identify cognitive and physical deficits and behaviors that affect risk of falls    -  [place] fall precautions as indicated by assessment   - Educate patient/family on patient safety including physical limitations  - Instruct patient to call for assistance with activity based on assessment  - Modify environment to reduce risk of injury  - Consider OT/PT consult to assist with strengthening/mobility  Outcome: Progressing     Problem: Prexisting or High Potential for Compromised Skin Integrity  Goal: Skin integrity is maintained or improved  Description: INTERVENTIONS:  - Identify patients at risk for skin breakdown  - Assess and monitor skin integrity  - Assess and monitor nutrition and hydration status  - Monitor labs   - Assess for incontinence   - Turn and reposition patient  - Assist with mobility/ambulation  - Relieve pressure over bony prominences  - Avoid friction and shearing  - Provide appropriate hygiene as needed including keeping skin clean and dry  - Evaluate need for skin moisturizer/barrier cream  - Collaborate with interdisciplinary team   - Patient/family teaching  - Consider wound care consult   Outcome: Progressing     Problem: INFECTION - ADULT  Goal: Absence or prevention of progression during hospitalization  Description: INTERVENTIONS:  - Assess and monitor for signs and symptoms of infection  - Monitor lab/diagnostic results  - Monitor all insertion sites, i e  indwelling lines, tubes, and drains  - Monitor endotracheal if appropriate and nasal secretions for changes in amount and color  - [place] appropriate cooling/warming therapies per order  - Administer medications as ordered  - Instruct and encourage patient and family to use good hand hygiene technique  - Identify and instruct in appropriate isolation precautions for identified infection/condition  Outcome: Progressing     Problem: SAFETY ADULT  Goal: Patient will remain free of falls  Description: INTERVENTIONS:  - Assess patient frequently for physical needs  -  Identify cognitive and physical deficits and behaviors that affect risk of falls    -  [place] fall precautions as indicated by assessment   - Educate patient/family on patient safety including physical limitations  - Instruct patient to call for assistance with activity based on assessment  - Modify environment to reduce risk of injury  - Consider OT/PT consult to assist with strengthening/mobility  Outcome: Progressing  Goal: Maintain or return to baseline ADL function  Description: INTERVENTIONS:  -  Assess patient's ability to carry out ADLs; assess patient's baseline for ADL function and identify physical deficits which impact ability to perform ADLs (bathing, care of mouth/teeth, toileting, grooming, dressing, etc )  - Assess/evaluate cause of self-care deficits   - Assess range of motion  - Assess patient's mobility; develop plan if impaired  - Assess patient's need for assistive devices and provide as appropriate  - Encourage maximum independence but intervene and supervise when necessary  - Involve family in performance of ADLs  - Assess for home care needs following discharge   - Consider OT consult to assist with ADL evaluation and planning for discharge  - Provide patient education as appropriate  Outcome: Progressing  Goal: Maintain or return mobility status to optimal level  Description: INTERVENTIONS:  - Assess patient's baseline mobility status (ambulation, transfers, stairs, etc )    - Identify cognitive and physical deficits and behaviors that affect mobility  - Identify mobility aids required to assist with transfers and/or ambulation (gait belt, sit-to-stand, lift, walker, cane, etc )  - [place] fall precautions as indicated by assessment  - Record patient progress and toleration of activity level on Mobility SBAR; progress patient to next Phase/Stage  - Instruct patient to call for assistance with activity based on assessment  - Consider rehabilitation consult to assist with strengthening/weightbearing, etc   Outcome: Progressing     Problem: DISCHARGE PLANNING  Goal: Discharge to home or other facility with appropriate resources  Description: INTERVENTIONS:  - Identify barriers to discharge w/patient and caregiver  - Arrange for needed discharge resources and transportation as appropriate  - Identify discharge learning needs (meds, wound care, etc )  - Arrange for interpretive services to assist at discharge as needed  - Refer to Case Management Department for coordinating discharge planning if the patient needs post-hospital services based on physician/advanced practitioner order or complex needs related to functional status, cognitive ability, or social support system  Outcome: Progressing     Problem: Knowledge Deficit  Goal: Patient/family/caregiver demonstrates understanding of disease process, treatment plan, medications, and discharge instructions  Description: Complete learning assessment and assess knowledge base    Interventions:  - Provide teaching at level of understanding  - Provide teaching via preferred learning methods  Outcome: Progressing     Problem: RESPIRATORY - ADULT  Goal: Achieves optimal ventilation and oxygenation  Description: INTERVENTIONS:  - Assess for changes in respiratory status  - Assess for changes in mentation and behavior  - Position to facilitate oxygenation and minimize respiratory effort  - Oxygen administered by appropriate delivery if ordered  - Initiate smoking cessation education as indicated  - Encourage broncho-pulmonary hygiene including cough, deep breathe, Incentive Spirometry  - Assess the need for suctioning and aspirate as needed  - Assess and instruct to report SOB or any respiratory difficulty  - Respiratory Therapy support as indicated  Outcome: Progressing   Care plan ongoing

## 2021-02-09 NOTE — PROGRESS NOTES
Vancomycin Assessment    Angela Coley is a 79 y o  male who is currently receiving vancomycin 1000mg IVPB Q 12 hours for skin-soft tissue infection     Relevant clinical data and objective history reviewed:  Creatinine   Date Value Ref Range Status   02/08/2021 1 88 (H) 0 60 - 1 30 mg/dL Final     Comment:     Standardized to IDMS reference method   10/30/2016 1 07 0 60 - 1 30 mg/dL Final     Comment:     Standardized to IDMS reference method   10/29/2016 1 10 0 60 - 1 30 mg/dL Final     Comment:     Standardized to IDMS reference method     /85   Pulse (!) 116   Temp 99 5 °F (37 5 °C) (Temporal)   Resp (!) 26   Ht 5' 7" (1 702 m)   Wt 124 kg (272 lb 11 3 oz)   SpO2 95%   BMI 42 71 kg/m²   I/O last 3 completed shifts: In: 48 [IV Piggyback:50]  Out: -   Lab Results   Component Value Date/Time    BUN 40 (H) 02/08/2021 11:34 AM    WBC 13 47 (H) 02/08/2021 11:34 AM    HGB 15 1 02/08/2021 11:34 AM    HCT 54 5 (H) 02/08/2021 11:34 AM    MCV 69 (L) 02/08/2021 11:34 AM     02/08/2021 11:34 AM     Temp Readings from Last 3 Encounters:   02/08/21 99 5 °F (37 5 °C) (Temporal)   10/31/16 98 8 °F (37 1 °C) (Tympanic)     Vancomycin Days of Therapy: 1    Assessment/Plan  The patient is currently on vancomycin utilizing scheduled dosing based on adjusted body weight (due to obesity)     The patient is currently receiving 1000mg IVPB Q 12 hours and is clinically appropriate and dose will be continued  Pharmacy will also follow closely for s/sx of nephrotoxicity, infusion reactions, and appropriateness of therapy  BMP and CBC will be ordered per protocol  Plan for trough as patient approaches steady state, prior to the 4th  dose at approximately 0600 on 2/10/21  Due to infection severity, will target a trough of 15-20 (appropriate for most indications)   Pharmacy will continue to follow the patients culture results and clinical progress daily      Stephon العراقي, Pharmacist

## 2021-02-09 NOTE — ASSESSMENT & PLAN NOTE
Lab Results   Component Value Date    HGBA1C 8 5 (H) 02/08/2021       No results for input(s): POCGLU in the last 72 hours  Blood Sugar Average: Last 72 hrs:  Patient at home regimen takes 56U of Novolin 70/30 BID before meals  · Will check HBA1c, none per chart review  · Will start patient on Insulin sliding scale and after monitoring insulin requirement while inpatient will start on Basal insulin QHS  · Alessandro/ carb controled level 2 diet  · Blood sugar checks before meals and at bedtime

## 2021-02-09 NOTE — ASSESSMENT & PLAN NOTE
Patient's Cr on admission 1 88  Baseline Cr not well known given last Cr in 2016 possible baseline about 1 0-1 1  · Avoid nephrotoxic medications  · Currently not on fluids given possible CHF exacerbation given BNP 1624 with no previous lab work per chart, Diuresed 40mg IV lasix in ED  Tolerating Diet  · Daily BMP and monitor Cr  Will await Echocardiogram results  · Will start on gentle hydration 50cc LR given edema limited to face with ELSY worsening following contrast with CT facial bones study   Cr 2/0 2 45

## 2021-02-09 NOTE — PROGRESS NOTES
Vancomycin IV Pharmacy-to-Dose Consultation    Garett Garcia is a 79 y o  male who is currently receiving Vancomycin IV with management by the Pharmacy Consult service  Assessment/Plan:  The patient was reviewed  Renal function is stable and no signs or symptoms of nephrotoxicity and/or infusion reactions were documented in the chart  Based on todays assessment, continue current vancomycin (day # 2) dosing of 1000 mg IV q12h, with a plan for trough to be drawn at 0600 on 02/10/2021  We will continue to follow the patients culture results and clinical progress daily      Andrew Tinsley, Pharmacist

## 2021-02-09 NOTE — ASSESSMENT & PLAN NOTE
Patient presented in respiratory distress with RR of 30pm, , WBC count 13 4 thousand with suspected source of infection being pneumonia with CXR showing hazy opacification of the left hemithorax, likely layering effusion  CT chest abdomen and pelvis shows patchy airspace opacities at the lung bases, liekly reflecting atelectasis vs pneumonia  LA 2 1 with follow up LA normalizing without addition of fluids  BP has been >89 systolic  Troponin elevated 0 10->0  12  Will trend  · Strep and Legionella urinary antigen ordered  Sputum culture  · Will continue on IV Rocephin and Azithromycin  Will continue Azithromycin for suspected CAP and Added Unasyn to regimen along with Vancomycin for cellulitis/ erysipelas coverage given erythema and edema of face  · Will trend WBC and monitor vitals  · Procal ordered for AM tomorrow  · Blood cultures x2  Grew Gram + Cocci in clusters  Will continue on IV Vancomycin pending sensitivities for Staph  And MRSA nares  TTE ordered    · ID consulted, recommendations appreciated

## 2021-02-09 NOTE — ASSESSMENT & PLAN NOTE
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

## 2021-02-09 NOTE — PROGRESS NOTES
Progress Note - Elayne Waite 1950, 79 y o  male MRN: [id number]    Unit/Bed#: ICU 02 Encounter: [id number]    Primary Care Provider: Deidre Harp MD   Date and time admitted to hospital: 2/8/2021 11:15 AM        Acute respiratory failure with hypoxia [facility])  Assessment & Plan  Patient with no reported past medical hx of COPD, CHF, SUDHAKAR, presents with 5 day hx of shortness of breath which he reports occurred after he tripped and fell in home and hit his head  CT head no acute intracranial abnormality  BNP elevated at 1624, no previous baseline per chart review  COVID 19, Influenza A and B, and RSV PCR Negative  Blood gas in ED shows pH 7 28, pCO2, pO2 92 8, Bicarb 30 5  He was diuresed with 40mg Lasix IV IN ER  CXR shows hazy opacification of the left hemithorax, likely layering effusion  CT chest abdomen and pelvis shows patchy airspace opacities at the lung bases, liekly reflecting atelectasis vs pneumonia  · Patient was tried on Nasal cannula but failed and placed on BiPap in ER at Ipap 15, Epap 5, rate 15, 60% FiO2  Will continue Bipap for now  Aim for QHS with attempt to wean off during the day  · Echo ordered for tomorrow given miles rales heard on examination however patient also seen laying flat and appears euvolemic in ER with no extremity edema  However face is erythematous and edematous  Lasix as needed  · Strep and Legionella urinary antigen ordered  Sputum culture  · Will continue on IV Rocephin and Azithromycin for suspected Community acquired pneumonia  · Bilateral lower extremity duplex ordered  Will hold off on CTA chest given ELSY for now  * Severe sepsis [facility])  Assessment & Plan  Patient presented in respiratory distress with RR of 30pm, , WBC count 13 4 thousand with suspected source of infection being pneumonia with CXR showing hazy opacification of the left hemithorax, likely layering effusion   CT chest abdomen and pelvis shows patchy airspace opacities at the lung bases, liekly reflecting atelectasis vs pneumonia  LA 2 1 with follow up LA normalizing without addition of fluids  BP has been >46 systolic  Troponin elevated 0 10->0  12  Will trend  · Strep and Legionella urinary antigen ordered  Sputum culture  · Will continue on IV Rocephin and Azithromycin  Will continue Azithromycin for suspected CAP and Added Unasyn to regimen along with Vancomycin for cellulitis/ erysipelas coverage given erythema and edema of face  · Will trend WBC and monitor vitals  · Procal ordered for AM tomorrow  · Blood cultures x2  Grew Gram + Cocci in clusters  Will continue on IV Vancomycin pending sensitivities for Staph  And MRSA nares  TTE ordered  · ID consulted, recommendations appreciated        Facial erythema  Assessment & Plan  Facial erythema and edema after contusion/ fall 5 days ago  Concern for cellulitis/ erysipelas  · Will add Unasyn due to concern for cellulitis/ erysipelas and continue Vancomycin  Continue Azithro for suspected Cap  Bacteremia due to Staphylococcus  Assessment & Plan  Patient presented with severe sepsis and SOB  Blood cultures x 2 grew Gram Positive Cocci in Clusters  · Will continue on IV vancomycin pending culture sensitivities as well as MRSA nares  · WBC count improving to 10 8 from 13 5 on admission, AM Procal pending/ will trend  Afebrile overnight  · TTE ordered and may require BRADFORD   · Infectious disease consulted, recommendations appreciated    Angiolipoma of left kidney  Assessment & Plan  CT wo contrast Chest abdomen and pelvis shows 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 and 4 1 cm in the mid kidney  Due to increased risk of hemorrhage with AML greater than 4 cm, further evaluation with urology consultation is recommended  Patient also has hx of Left partial nephrectomy in 2016 due to left lower pole tumor in Oct 2016  · Will consult urology, recommendations appreciated     · If urinary bleeding occurs, will consult IR to avoid left nephrectomy in light of renal failure      Head injury  Assessment & Plan  Patient reports he had an unwitnessed fall about 5 days ago for which he did not lose consiousness or have a postictal state  CT head wo contrast shows no acute intracranial abnormality  CT facial bones with contrast shows no acute osseous abnormality in the facial bones  · Neuro checks Q4H  Type 2 diabetes mellitus ([address] )  Assessment & Plan  Lab Results   Component Value Date    HGBA1C 8 5 (H) 02/08/2021       No results for input(s): POCGLU in the last 72 hours  Blood Sugar Average: Last 72 hrs:  Patient at home regimen takes 56U of Novolin 70/30 BID before meals  · Will check HBA1c, none per chart review  · Will start patient on Insulin sliding scale and after monitoring insulin requirement while inpatient will start on Basal insulin QHS  · Alessandro/ carb controled level 2 diet  · Blood sugar checks before meals and at bedtime  ELSY (acute kidney injury) ([address] )  Assessment & Plan  Patient's Cr on admission 1 88  Baseline Cr not well known given last Cr in 2016 possible baseline about 1 0-1 1  · Avoid nephrotoxic medications  · Currently not on fluids given possible CHF exacerbation given BNP 1624 with no previous lab work per chart, Diuresed 40mg IV lasix in ED  Tolerating Diet  · Daily BMP and monitor Cr  Will await Echocardiogram results  · Will start on gentle hydration 50cc LR given edema limited to face with ELSY worsening following contrast with CT facial bones study  Cr 2/0 2 45    ----------------------------------------------------------------------------------------  HPI/24hr events: Patient overnight became restless and agitated and ripped off his BiPap   He was given     Disposition: Continue Critical Care   Code Status: Prior  ---------------------------------------------------------------------------------------  SUBJECTIVE  Patient ws seen and examined at bedside in no acute distress on BiPap sleeping  He was woken up from sleep  Patient denies any shortness of breath currently on BiPap and looks to be comfortable with it on  He denies chest pain, abdominal pain, constipation, diarrhea, dysuria, hematuria  Review of Systems  Review of systems was reviewed and negative unless stated above in HPI/24-hour events   ---------------------------------------------------------------------------------------  OBJECTIVE    Vitals   Vitals:    21 0100 21 0200 21 0300 21 0400   BP: 135/79 129/67 124/64 151/71   Pulse: 91 90 87 92   Resp: (!) 23 20 21 (!) 27   Temp:    98 2 °F (36 8 °C)   TempSrc:    Temporal   SpO2: 95% 93% 93% 92%   Weight:       Height:         Temp (24hrs), Av 4 °F (37 4 °C), Min:98 2 °F (36 8 °C), Max:100 7 °F (38 2 °C)  Current: Temperature: 98 2 °F (36 8 °C)          Respiratory:  SpO2: SpO2: 90 %, SpO2 Activity: SpO2 Activity: At Rest, SpO2 Device: O2 Device: Nasal cannula, Capnography:         Invasive/non-invasive ventilation settings   Respiratory    Lab Data (Last 4 hours)    None         O2/Vent Data (Last 4 hours)       0325          Non-Invasive Ventilation Mode BiPAP                   Physical Exam  Constitutional:       Appearance: He is obese  He is ill-appearing  Comments: Morbidly Obese   Eyes:      Comments: Eyelids erythematous and edematous with erythema spreading down across face over nose and cheeks  Eyes have some bilateral crusting shut on the lateral borders bilaterally  Cardiovascular:      Rate and Rhythm: Normal rate and regular rhythm  Pulmonary:      Breath sounds: No wheezing  Comments: On BiPap setting Ipap 18, Epap 8, rate 18, Fio2 40% saturating about 93%  Left upper and lower lung fields very difficult to auscultate/ decreased air movement  Right upper lobe and right lower lobes mildly rhonchus on exam    Abdominal:      General: Bowel sounds are normal  There is distension  Palpations: Abdomen is soft  Tenderness: There is no abdominal tenderness  There is no guarding  Musculoskeletal:      Right lower leg: No edema  Left lower leg: No edema  Skin:     General: Skin is warm and dry  Capillary Refill: Capillary refill takes less than 2 seconds  Findings: Rash (Bilateral extremity dry skin covered in plaques covering large surface area ) present  Neurological:      Mental Status: He is alert           Laboratory and Diagnostics:  Results from last 7 days   Lab Units 02/09/21  0526 02/08/21  1134   WBC Thousand/uL 10 82* 13 47*   HEMOGLOBIN g/dL 14 2 15 1   HEMATOCRIT % 51 6* 54 5*   PLATELETS Thousands/uL 256 304   NEUTROS PCT % 88* 90*   MONOS PCT % 9 8     Results from last 7 days   Lab Units 02/09/21  0526 02/08/21  1134   SODIUM mmol/L 139 136   POTASSIUM mmol/L 4 6 5 1   CHLORIDE mmol/L 100 98*   CO2 mmol/L 31 33*   ANION GAP mmol/L 8 5   BUN mg/dL 55* 40*   CREATININE mg/dL 2 45* 1 88*   CALCIUM mg/dL 8 5 8 7   GLUCOSE RANDOM mg/dL 172* 192*   ALT U/L  --  28   AST U/L  --  41   ALK PHOS U/L  --  86   ALBUMIN g/dL  --  2 9*   TOTAL BILIRUBIN mg/dL  --  0 80     Results from last 7 days   Lab Units 02/09/21  0526 02/08/21  1134   MAGNESIUM mg/dL 2 9* 1 9   PHOSPHORUS mg/dL 6 9*  --       Results from last 7 days   Lab Units 02/08/21  1134   INR  1 15   PTT seconds 33      Results from last 7 days   Lab Units 02/08/21  1632 02/08/21  1134   TROPONIN I ng/mL 0 12* 0 10*     Results from last 7 days   Lab Units 02/08/21  1345 02/08/21  1134   LACTIC ACID mmol/L 1 4 2 2*     ABG:  Results from last 7 days   Lab Units 02/08/21  1240   PH ART  7 284*   PCO2 ART mm Hg 65 7*   PO2 ART mm Hg 94 5   HCO3 ART mmol/L 30 5*   BASE EXC ART mmol/L 1 8   ABG SOURCE  Radial, Left     VBG:  Results from last 7 days   Lab Units 02/08/21  1240   ABG SOURCE  Radial, Left           Micro  Results from last 7 days   Lab Units 02/08/21  1134   GRAM STAIN RESULT  Gram positive cocci in clusters*  Gram positive cocci in clusters*       Imaging: I have personally reviewed pertinent reports  Intake and Output  I/O       02/07 0701 - 02/08 0700 02/08 0701 - 02/09 0700    IV Piggyback  500    Total Intake(mL/kg)  500 (4)    Urine (mL/kg/hr)  560    Total Output  560    Net  -60          Unmeasured Urine Occurrence  1 x          Height and Weights   Height: 5' 7" (170 2 cm)  IBW: 66 1 kg  Body mass index is 42 71 kg/m²  Weight (last 2 days)     Date/Time   Weight    02/08/21 1700   124 (272 71)                Nutrition        Active Medications  Scheduled Meds:  Current Facility-Administered Medications   Medication Dose Route Frequency Provider Last Rate    acetaminophen  650 mg Oral Q6H PRN LUIS Dougherty      ampicillin-sulbactam  3 g Intravenous Q6H LUIS Dougherty Stopped (02/09/21 0300)    azithromycin  500 mg Intravenous Q24H Neophytos CHANDRIKA Manley MD      lactated ringers  50 mL/hr Intravenous Continuous Neophytos CHANDRIKA Manley MD 50 mL/hr (02/09/21 1929)    nystatin   Topical BID FarhatLUIS shepherd      vancomycin  10 mg/kg (Adjusted) Intravenous Q12H LUIS Dougherty 1,000 mg (02/09/21 7209)     Continuous Infusions:  lactated ringers, 50 mL/hr, Last Rate: 50 mL/hr (02/09/21 6147)      PRN Meds:   acetaminophen, 650 mg, Q6H PRN        Invasive Devices Review  Invasive Devices     Peripheral Intravenous Line            Peripheral IV 02/08/21 Left Wrist less than 1 day    Peripheral IV 02/08/21 Right Forearm less than 1 day          Drain            External Urinary Catheter Small less than 1 day                Rationale for remaining devices: Acute respiratory failure with Hypoxia requiring BiPap   Patient currently on settings of IPap 18, Epap 8, Rate 18, FiO2 40% secondary to pneumonia and presented Septic with now confirmed Bacteriemia    ---------------------------------------------------------------------------------------  Advance Directive and Living Will:      Power of :    POLST: ---------------------------------------------------------------------------------------  Care Time Delivered:   No Critical Care time spent       Chance Fernando MD      Portions of the record may have been created with voice recognition software  Occasional wrong word or "sound a like" substitutions may have occurred due to the inherent limitations of voice recognition software    Read the chart carefully and recognize, using context, where substitutions have occurred

## 2021-02-09 NOTE — QUICK NOTE
Patient's brother Eleuterio Freshwater called  He was updated several times throughout the day including on the patient's cellulitis, pneumonia, and bacteremia  Once patient was off BiPap, I went into patient's room and dialed brother from phone and he was able to speak with patient  Brother then reported to me that he sounded a lot better compared to when he had to bring him in to the hospital  His brother would also be arranging different family members to help him around his home and with his insulin regimen  All questions were answered

## 2021-02-09 NOTE — CONSULTS
Consultation - Infectious Disease   Jon Melchor 79 y o  male MRN: [id number]  Unit/Bed#: ICU 02 Encounter: [id number]      IMPRESSION & RECOMMENDATIONS:   Impression/Recommendations:  1  Severe sepsis, POA  Tachycardia, WBC, lactic acidosis  Due to #2/3  No other appreciable source  Flu/RSV/COVID PCR, Strep/Legionella antigen, UA, CT C/A/P negative  Hemodynamically stable  Rec:  · Continue vancomycin as below  · D/C azithromycin and Unasyn  · Follow up blood cultures as below  · Follow temperatures closely  · Recheck CBC in AM  · Supportive care as per the primary service    2  GPC bacteremia  Suspect Staph given Gram stain morphology  Likely due to #3  No other appreciable source  No indwelling intravascular devices  Rec:  · Continue vancomycin  · Pharmacy consult for vancomycin dosing  · Follow-up ID/susceptibilites and tailor antibiotics as indicated  · Recheck blood cultures Thursday AM  · Check TTE    3  Facial cellulitis  Post-traumatic due to recent fall  CT facial bones negative for abscess or fracture  Likely due to [facility] ROG[facility] given #2  Rec:  · Continue antibiotics as above  · Serial exams    4  Acute hypoxic respiratory failure  Likely due to sepsis as above in setting of MO, SUDHAKAR, possible COPD (prior smoking history)  Doubt clinical pneumonia given alternative source of infection as above  CT chest unremarkable  Improved with diuresis, BiPAP  Rec:  · Continue treatment of bacteremia, cellulitis as above  · No additional antibiotics   · Supportive care as per the primary service    5  ELSY  Unclear recent baseline  Multifactorial due to above  Worsening  Rec:  · Follow creatinine closely and dose-adjust antibiotics as indicated  · Recheck BMP in AM   · Consider Nephrology consult if worsens    6  Poorly-controlled DM  A1c 8 5  Risk factor for infection  7   MO with SUDHAKAR  The above impression and plan was discussed in detail with Dr Landon Smith      Antibiotics:  Vancomycin #2  Unasyn #2  Ramses #2    Thank you for this consultation  We will follow along with you  HISTORY OF PRESENT ILLNESS:  Reason for Consult: Bacteremia    HPI: Vianey Lindsey is a 79 y o  male with DM, no recent follow-up in our system  Tripped at home last week and struck face on fall  Developed increased swelling and redness  In this setting began to develop worsening of chronic SOB and ultimately presented to the ED on 2/8  Upon arrival had low-grade temp with tachycardia, tachypnea and hypoxia  Labs revealed leukocytosis, lactic acidosis, and ELSY  He was noted on exam to have a facial cellulitis  He underwent a pan-CT scan which suggested bibasilar atelectasis versus pneumonia  He was started on antibiotics, diuresis, and BiPAP  He was admitted to the ICU  His blood cultures from admission have come back growing GPCs in clusters  His respiratory status has improved  We are asked to comment on further evaluation and management  In performing this consult, I have reviewed prior admission and outpatient visit records in detail  REVIEW OF SYSTEMS:  Denies current fevers, chills, sweats, nausea, vomiting, or diarrhea  A complete system-based review of systems is otherwise negative      PAST MEDICAL HISTORY:  Past Medical History:   Diagnosis Date    Cancer [facility])     Chronic kidney disease     Colon cancer ([facility] [address] )     s/p colon resection 9/2016    Diabetes mellitus ([facility] [address] )     Hypertension     Kidney lesion     mass of left kidney    Obesity      Past Surgical History:   Procedure Laterality Date    COLON SURGERY      COLON SURGERY      for ca of colon 9/16/16    COLONOSCOPY      NEPHRECTOMY Left 10/27/2016    Procedure: PARTIAL NEPHRECTOMY ;  Surgeon: Katherin Conroy MD;  Location[facility];  Service:        FAMILY HISTORY:  Non-contributory    SOCIAL HISTORY:  Social History     Substance and Sexual Activity   Alcohol Use Never    Frequency: Never     Social History     Substance and Sexual Activity   Drug Use No     Social History     Tobacco Use   Smoking Status Never Smoker   Smokeless Tobacco Never Used       ALLERGIES:  Allergies   Allergen Reactions    Shellfish-Derived Products        MEDICATIONS:  All current active medications have been reviewed  PHYSICAL EXAM:  Vitals:  Temp:  [97 9 °F (36 6 °C)-100 5 °F (38 1 °C)] 97 9 °F (36 6 °C)  HR:  [] 107  Resp:  [0-33] 31  BP: ()/(53-85) 114/55  SpO2:  [82 %-96 %] 92 %  Temp (24hrs), Av °F (37 2 °C), Min:97 9 °F (36 6 °C), Max:100 5 °F (38 1 °C)  Current: Temperature: 97 9 °F (36 6 °C)     Physical Exam:  General:  Well-nourished, well-developed, in no acute distress, appears unkempt  Eyes:  Conjunctive clear with no hemorrhages or effusions  Oropharynx:  No ulcers, no lesions  Neck:  Supple, no lymphadenopathy  Lungs:  Clear to auscultation bilaterally anteriorlt, no accessory muscle use  Cardiac:  Tachycardia with a regular rhythm, distant heart sounds  Abdomen:  Obese, non-tender  Extremities:  No peripheral cyanosis, clubbing, or edema  Scattered excorations/scabs left lateral thigh  Skin:  No rashes, no ulcers, chronic xerosis feet with poor hygiene  Neurological:  Moves all four extremities spontaneously, sensation grossly intact    LABS, IMAGING, & OTHER STUDIES:  Lab Results:  I have personally reviewed pertinent labs    Results from last 7 days   Lab Units 21  0526 21  1134   POTASSIUM mmol/L 4 6 5 1   CHLORIDE mmol/L 100 98*   CO2 mmol/L 31 33*   BUN mg/dL 55* 40*   CREATININE mg/dL 2 45* 1 88*   EGFR ml/min/1 73sq m 26 35   CALCIUM mg/dL 8 5 8 7   AST U/L  --  41   ALT U/L  --  28   ALK PHOS U/L  --  86     Results from last 7 days   Lab Units 21  0526 21  1134   WBC Thousand/uL 10 82* 13 47*   HEMOGLOBIN g/dL 14 2 15 1   PLATELETS Thousands/uL 256 304     Results from last 7 days   Lab Units 21  1936 21  1134   GRAM STAIN RESULT   --  Gram positive cocci in clusters*  Gram positive cocci in clusters*   LEGIONELLA URINARY ANTIGEN  Negative  --        Imaging Studies:   I have personally reviewed pertinent imaging study reports and images in PACS  CT chest reviewed personally streaky bibasilar atelectasis    EKG, Pathology, and Other Studies:   I have personally reviewed pertinent reports

## 2021-02-09 NOTE — CASE MANAGEMENT
LOS 1 DAY  RISK OF UNPLANNED READMISSION SCORE 12  30 DAY READMISSION: NO  BUNDLE: NO    CM attempted to meet with patient, patient currently on BIPAP  CM s/w patient's brother, Mundo Palacios, by phone  Per Mundo Palacios, patient lives in [facility] [place] [address] [facility]  unsure of exact address, patient's listed home address is as a PO Box  Mundo Palacios states that patient lives in a 1-story trailer with 3 SHAHIDA  PTA, patient was not using any DME/AD for ambulation and states IPTA with all ADLs  No Hx VNA, STR, MH, or SA identified during this assessment  PCP: Dr Irina Haile    Preferred Pharmacy: [facility] states that it is a pharmacy in [place]  No formal POA identified  Kaitrobbietanya Philip states that either he or patient's step-daughter will provide transportation at discharge  CM will continue to follow for any DCP needs

## 2021-02-09 NOTE — ASSESSMENT & PLAN NOTE
Facial erythema and edema after contusion/ fall 5 days ago  Concern for cellulitis/ erysipelas  · Will add Unasyn due to concern for cellulitis/ erysipelas and continue Vancomycin  Continue Azithro for suspected Cap

## 2021-02-09 NOTE — ASSESSMENT & PLAN NOTE
Patient with no reported past medical hx of COPD, CHF, SUDHAKAR, presents with 5 day hx of shortness of breath which he reports occurred after he tripped and fell in home and hit his head  CT head no acute intracranial abnormality  BNP elevated at 1624, no previous baseline per chart review  COVID 19, Influenza A and B, and RSV PCR Negative  Blood gas in ED shows pH 7 28, pCO2, pO2 92 8, Bicarb 30 5  He was diuresed with 40mg Lasix IV IN ER  CXR shows hazy opacification of the left hemithorax, likely layering effusion  CT chest abdomen and pelvis shows patchy airspace opacities at the lung bases, liekly reflecting atelectasis vs pneumonia  · Patient was tried on Nasal cannula but failed and placed on BiPap in ER at Ipap 15, Epap 5, rate 15, 60% FiO2  Will continue Bipap for now  Aim for QHS with attempt to wean off during the day  · Echo ordered for tomorrow given miles rales heard on examination however patient also seen laying flat and appears euvolemic in ER with no extremity edema  However face is erythematous and edematous  Lasix as needed  · Strep and Legionella urinary antigen ordered  Sputum culture  · Will continue on IV Rocephin and Azithromycin for suspected Community acquired pneumonia  · Will hold off on CTA chest/ neck given ELSY for now   No calf tenderness and negative Frankie's sign on exam  SVC syndrome concern due to facial edema but will hold off on CT with contrast given worsening renal function

## 2021-02-09 NOTE — ASSESSMENT & PLAN NOTE
Patient presented with severe sepsis and SOB  Blood cultures x 2 grew Gram Positive Cocci in Clusters  · Will continue on IV vancomycin pending culture sensitivities as well as MRSA nares  · WBC count improving to 10 8 from 13 5 on admission, AM Procal pending/ will trend  Afebrile overnight     · TTE ordered and may require BRADFORD   · Infectious disease consulted, recommendations appreciated

## 2021-02-10 LAB — MRSA NOSE QL CULT: NORMAL

## 2021-02-10 NOTE — DEATH NOTE
INPATIENT DEATH NOTE  Didi Mccarty 79 y o  male MRN: [id number]  Unit/Bed#: ICU 02 Encounter: [id number]    Date, Time and Cause of Death    Date of Death: 21  Time of Death: 10:49 PM  Preliminary Cause of Death: Cardiac arrest ([facility] [address] )  Entered by: Arline Selby[JR1 1]     Attribution     JR1 1 Khari Curtis PA-C 21 23:22           Patient's Information  Pronounced by: Chad COULTER  Did the patient's death occur in the ED?: No  Did the patient's death occur in the OR?: No  Did the patient's death occur less than 10 days post-op?: No  Did the patient's death occur within 24 hours of admission?: No  Was code status DNR at the time of death?: No    PHYSICAL EXAM:  Unresponsive to noxious stimuli, Spontaneous respirations absent, Breath sounds absent, Carotid pulse absent, Heart sounds absent and Pupillary light reflex absent    Medical Examiner notification criteria:  NONE APPLICABLE   Medical Examiner's office notified?:  No, does not meet ME notification criteria   Medical Examiner accepted case?:  No  Name of Medical Examiner: n/a    Family Notification  Was the family notified?: Yes  Date Notified: 21  Time Notified: 7000  Notified by: Chad COULTER  Name of Family Notified of Death:  Juan Manuel Blacktylor   Relationship to Patient: Brother  Family Notification Route: Telephone  Was the family told to contact a  home?: Yes  Name of  Home[de-identified] [address] [place]    Autopsy Options:  Post-mortem examination declined by next of kin    Primary Service Attending Physician notified?:  yes - Attending:  Chichi Tamez MD    Physician/Resident responsible for completing Discharge Summary:  Chad Rodriguez

## 2021-02-10 NOTE — DISCHARGE SUMMARY
Discharge Summary - Didi Mccarty 79 y o  male MRN: [id number]    Unit/Bed#: ICU 02 Encounter: [id number] PCP: Conchita Gonzalez MD    Admission Date:   Admission Orders (From admission, onward)     Ordered        21 1543  Inpatient Admission  Once                     Admitting Diagnosis: Shortness of breath [R06 02]  CHF (congestive heart failure) ([facility]) [I50 9]  Pneumonia [J18 9]  NSTEMI (non-ST elevated myocardial infarction) ([facility] [address] ) [I21 4]  ELSY (acute kidney injury) ([facility] [address] ) [N17 9]  Respiratory failure with hypercapnia ([facility] [address] ) [J96 92]  Severe sepsis ([facility] [address] ) [A41 9, R65 20]      Procedures Performed:   Orders Placed This Encounter   Procedures    Critical Care    ED ECG Documentation Only       HPI/Summary of Hospital Course: 78 yo M w/ pmh of DM2, SUDHAKAR, renal mass s/p left partial nephrectomy in 2016, and colon CA s/p resection in 2016  Pt presented in respiratory face and b/l swollen eyes requiring bipap and diuresis  After talking to his family we found that he was having difficulty breathing for 5 days prior and 3 days prior walked into a 2x4 that was supporting the roof of his trailer from all the snow we just got  After initial reponse to diuresis bipap support was able to be weaned off  Pt was planned and placed on bipap around 22:00  At 22:25 he was found to be unresponsive and pulseless, cpr immediately initiated and bagging started  Advanced airway was difficult but achieved on the 3rd attempt  After approximately 25 minutes of ACLS there was no return of spontaneous circulation and coding efforts discontinued after discussion with staff present in the room  Family notified after pronouncing at approximately 10:49  Several attempts made to contact family during code but only VM was able to be left      Significant Findings, Care, Treatment and Services Provided: n/a    Complications: death    Disposition:      Final Diagnosis: Cardiac arrest d/t acute respiratory failure    Resolved Problems Date Reviewed: 10/31/2016    None          Condition at Time of Death: supine in bed    Date, Time and Cause of Death    Date of Death: 21  Time of Death: 10:49 PM  Preliminary Cause of Death: Cardiac arrest ([facility] [address] )  Entered by: Arline Selby[JR1 1]     Attribution     JR1 1 Avril Watkins PA-C 21 23:22          Death Note:    INPATIENT DEATH NOTE  Alexandra Shearer 79 y o  male MRN: [id number]  Unit/Bed#: ICU 02 Encounter: [id number]    Date, Time and Cause of Death    Date of Death: 21  Time of Death: 10:49 PM  Preliminary Cause of Death: Cardiac arrest ([facility] [address] )  Entered by: Arline Selby[1 1]     Attribution     JR1 1 Arvil Watkins PA-C 21 23:22           Patient's Information  Pronounced by: Justin COULTER  Did the patient's death occur in the ED?: No  Did the patient's death occur in the OR?: No  Did the patient's death occur less than 10 days post-op?: No  Did the patient's death occur within 24 hours of admission?: No  Was code status DNR at the time of death?: No    PHYSICAL EXAM:  Unresponsive to noxious stimuli, Spontaneous respirations absent, Breath sounds absent, Carotid pulse absent, Heart sounds absent and Pupillary light reflex absent    Medical Examiner notification criteria:  NONE APPLICABLE   Medical Examiner's office notified?:  No, does not meet ME notification criteria   Medical Examiner accepted case?:  No  Name of Medical Examiner: n/a    Family Notification  Was the family notified?: Yes  Date Notified: 21  Time Notified: 7552  Notified by: Justin COULTER  Name of Family Notified of Death:  Tracy Cortez   Relationship to Patient: Brother  Family Notification Route: Telephone  Was the family told to contact a  home?: Yes  Name of  Home[de-identified] [address] [place]    Autopsy Options:  Post-mortem examination declined by next of kin    Primary Service Attending Physician notified?:  yes - Attending:  Gerhard Medrano MD    Physician/Resident responsible for completing Discharge Summary:  Seabron Gums

## 2021-02-10 NOTE — PROCEDURES
Intubation    Date/Time: 2/9/2021 10:25 PM  Performed by: Yarelis Cook PA-C  Authorized by: Yarelis Cook PA-C     Consent:     Consent obtained:  Emergent situation  Pre-procedure details:     Patient status:  Unresponsive  Procedure details:     Preoxygenation:  Bag valve mask    CPR in progress: yes      Laryngoscope blade: Mac 4    Tube size (mm):  6 5    Tube type:  Cuffed    Number of attempts:  3 or more    Ventilation between attempts: yes      Cricoid pressure: yes      Tube visualized through cords: yes    Placement assessment:     ETT to teeth:  25    Tube secured with: Adhesive tape    Breath sounds:  Equal    Placement verification: chest rise, condensation, equal breath sounds, ETCO2 detector and tube exhalation    Post-procedure details:     Patient tolerance of procedure: Tolerated with difficulty  Comments:      Patient actively coding, difficult airway with redundent tissue and very anterior airway

## 2021-02-10 NOTE — CODE DOCUMENTATION
22:25 patient found to be on bipap, blue and unresponsive  Pulses absent and CPR initiated immediately, code blue called  Bipap discontinued and bag ventilated  3 attempts needed at intubation with bagging in between  CPR continued throughout attempts and successful intubation achieved with boogie, pink frothy sputum noted in ETT  Several round of epinephrine given with only PEA present on lifepak  Attending informed  Code continued, several attempts made at calling family, VM left  Discussion with everyone present in the room held while CPR in progress, everyone agreed to end code with no additional intervention  22:49 patients brother Luis Hunter called back and informed on the situation  All questions answered  [facility] home chosen and he declines autopsy

## 2021-02-11 LAB
BACTERIA BLD CULT: ABNORMAL
BACTERIA BLD CULT: ABNORMAL
GRAM STN SPEC: ABNORMAL
GRAM STN SPEC: ABNORMAL

## 2021-02-11 NOTE — RESPIRATORY THERAPY NOTE
Code blue called  2899 patient bagged on 100% with BMV  Patient bagged prior to intubation attempt and post intubation

## 2021-07-01 NOTE — PLAN OF CARE
Problem: Potential for Falls  Goal: Patient will remain free of falls  Description: INTERVENTIONS:  - Assess patient frequently for physical needs  -  Identify cognitive and physical deficits and behaviors that affect risk of falls    -  [place] fall precautions as indicated by assessment   - Educate patient/family on patient safety including physical limitations  - Instruct patient to call for assistance with activity based on assessment  - Modify environment to reduce risk of injury  - Consider OT/PT consult to assist with strengthening/mobility  Outcome: Progressing     Problem: Prexisting or High Potential for Compromised Skin Integrity  Goal: Skin integrity is maintained or improved  Description: INTERVENTIONS:  - Identify patients at risk for skin breakdown  - Assess and monitor skin integrity  - Assess and monitor nutrition and hydration status  - Monitor labs   - Assess for incontinence   - Turn and reposition patient  - Assist with mobility/ambulation  - Relieve pressure over bony prominences  - Avoid friction and shearing  - Provide appropriate hygiene as needed including keeping skin clean and dry  - Evaluate need for skin moisturizer/barrier cream  - Collaborate with interdisciplinary team   - Patient/family teaching  - Consider wound care consult   Outcome: Progressing     Problem: PAIN - ADULT  Goal: Verbalizes/displays adequate comfort level or baseline comfort level  Description: Interventions:  - Encourage patient to monitor pain and request assistance  - Assess pain using appropriate pain scale  - Administer analgesics based on type and severity of pain and evaluate response  - Implement non-pharmacological measures as appropriate and evaluate response  - Consider cultural and social influences on pain and pain management  - Notify physician/advanced practitioner if interventions unsuccessful or patient reports new pain  Outcome: Progressing     Problem: INFECTION - ADULT  Goal: Absence or prevention of progression during hospitalization  Description: INTERVENTIONS:  - Assess and monitor for signs and symptoms of infection  - Monitor lab/diagnostic results  - Monitor all insertion sites, i e  indwelling lines, tubes, and drains  - Monitor endotracheal if appropriate and nasal secretions for changes in amount and color  - [place] appropriate cooling/warming therapies per order  - Administer medications as ordered  - Instruct and encourage patient and family to use good hand hygiene technique  - Identify and instruct in appropriate isolation precautions for identified infection/condition  Outcome: Progressing     Problem: SAFETY ADULT  Goal: Patient will remain free of falls  Description: INTERVENTIONS:  - Assess patient frequently for physical needs  -  Identify cognitive and physical deficits and behaviors that affect risk of falls    -  [place] fall precautions as indicated by assessment   - Educate patient/family on patient safety including physical limitations  - Instruct patient to call for assistance with activity based on assessment  - Modify environment to reduce risk of injury  - Consider OT/PT consult to assist with strengthening/mobility  Outcome: Progressing  Goal: Maintain or return to baseline ADL function  Description: INTERVENTIONS:  -  Assess patient's ability to carry out ADLs; assess patient's baseline for ADL function and identify physical deficits which impact ability to perform ADLs (bathing, care of mouth/teeth, toileting, grooming, dressing, etc )  - Assess/evaluate cause of self-care deficits   - Assess range of motion  - Assess patient's mobility; develop plan if impaired  - Assess patient's need for assistive devices and provide as appropriate  - Encourage maximum independence but intervene and supervise when necessary  - Involve family in performance of ADLs  - Assess for home care needs following discharge   - Consider OT consult to assist with ADL evaluation and planning for discharge  - Provide patient education as appropriate  Outcome: Progressing  Goal: Maintain or return mobility status to optimal level  Description: INTERVENTIONS:  - Assess patient's baseline mobility status (ambulation, transfers, stairs, etc )    - Identify cognitive and physical deficits and behaviors that affect mobility  - Identify mobility aids required to assist with transfers and/or ambulation (gait belt, sit-to-stand, lift, walker, cane, etc )  - [place] fall precautions as indicated by assessment  - Record patient progress and toleration of activity level on Mobility SBAR; progress patient to next Phase/Stage  - Instruct patient to call for assistance with activity based on assessment  - Consider rehabilitation consult to assist with strengthening/weightbearing, etc   Outcome: Progressing     Problem: DISCHARGE PLANNING  Goal: Discharge to home or other facility with appropriate resources  Description: INTERVENTIONS:  - Identify barriers to discharge w/patient and caregiver  - Arrange for needed discharge resources and transportation as appropriate  - Identify discharge learning needs (meds, wound care, etc )  - Arrange for interpretive services to assist at discharge as needed  - Refer to Case Management Department for coordinating discharge planning if the patient needs post-hospital services based on physician/advanced practitioner order or complex needs related to functional status, cognitive ability, or social support system  Outcome: Progressing     Problem: Knowledge Deficit  Goal: Patient/family/caregiver demonstrates understanding of disease process, treatment plan, medications, and discharge instructions  Description: Complete learning assessment and assess knowledge base    Interventions:  - Provide teaching at level of understanding  - Provide teaching via preferred learning methods  Outcome: Progressing     Problem: RESPIRATORY - ADULT  Goal: Achieves optimal ventilation and oxygenation  Description: INTERVENTIONS:  - Assess for changes in respiratory status  - Assess for changes in mentation and behavior  - Position to facilitate oxygenation and minimize respiratory effort  - Oxygen administered by appropriate delivery if ordered  - Initiate smoking cessation education as indicated  - Encourage broncho-pulmonary hygiene including cough, deep breathe, Incentive Spirometry  - Assess the need for suctioning and aspirate as needed  - Assess and instruct to report SOB or any respiratory difficulty  - Respiratory Therapy support as indicated  Outcome: Progressing No difficulties